# Patient Record
Sex: FEMALE | Race: WHITE | NOT HISPANIC OR LATINO | Employment: OTHER | URBAN - NONMETROPOLITAN AREA
[De-identification: names, ages, dates, MRNs, and addresses within clinical notes are randomized per-mention and may not be internally consistent; named-entity substitution may affect disease eponyms.]

---

## 2017-03-27 ENCOUNTER — TRANSCRIBE ORDERS (OUTPATIENT)
Dept: ADMINISTRATIVE | Facility: HOSPITAL | Age: 57
End: 2017-03-27

## 2017-03-27 DIAGNOSIS — E04.1 THYROID NODULE: Primary | ICD-10-CM

## 2017-04-03 ENCOUNTER — HOSPITAL ENCOUNTER (OUTPATIENT)
Dept: ULTRASOUND IMAGING | Facility: HOSPITAL | Age: 57
Discharge: HOME | End: 2017-04-03
Attending: INTERNAL MEDICINE | Admitting: INTERNAL MEDICINE

## 2017-04-03 DIAGNOSIS — E04.1 THYROID NODULE: ICD-10-CM

## 2017-04-03 PROCEDURE — 76536 US EXAM OF HEAD AND NECK: CPT

## 2017-04-03 PROCEDURE — 76536 US EXAM OF HEAD AND NECK: CPT | Performed by: RADIOLOGY

## 2017-04-24 ENCOUNTER — OFFICE VISIT (OUTPATIENT)
Dept: RETAIL CLINIC | Facility: CLINIC | Age: 57
End: 2017-04-24

## 2017-04-24 VITALS
BODY MASS INDEX: 36.26 KG/M2 | RESPIRATION RATE: 20 BRPM | WEIGHT: 225.6 LBS | HEART RATE: 76 BPM | OXYGEN SATURATION: 96 % | TEMPERATURE: 98.7 F | HEIGHT: 66 IN

## 2017-04-24 DIAGNOSIS — H66.002 ACUTE SUPPURATIVE OTITIS MEDIA OF LEFT EAR WITHOUT SPONTANEOUS RUPTURE OF TYMPANIC MEMBRANE, RECURRENCE NOT SPECIFIED: Primary | ICD-10-CM

## 2017-04-24 PROCEDURE — 99213 OFFICE O/P EST LOW 20 MIN: CPT | Performed by: NURSE PRACTITIONER

## 2017-04-24 RX ORDER — AMOXICILLIN AND CLAVULANATE POTASSIUM 875; 125 MG/1; MG/1
1 TABLET, FILM COATED ORAL 2 TIMES DAILY
Qty: 10 TABLET | Refills: 0 | Status: SHIPPED | OUTPATIENT
Start: 2017-04-24 | End: 2017-04-29

## 2017-04-24 RX ORDER — FLUTICASONE PROPIONATE 50 MCG
2 SPRAY, SUSPENSION (ML) NASAL DAILY
Qty: 1 BOTTLE | Refills: 0 | Status: SHIPPED | OUTPATIENT
Start: 2017-04-24 | End: 2017-05-24

## 2017-04-24 NOTE — PROGRESS NOTES
"Subjective   Wendi Washington is a 56 y.o. female.   Chief Complaint   Patient presents with   • Earache      Earache    There is pain in the left ear. This is a new problem. The current episode started in the past 7 days. The problem occurs constantly. The problem has been gradually worsening. There has been no fever. Pertinent negatives include no coughing, headaches, rhinorrhea or sore throat. She has tried NSAIDs for the symptoms. The treatment provided mild relief.      Wendi presents to the clinic today c/o left earache which started 3 days ago. Associated symptoms include sinus pressure. Has tried ibuprofen without adequate relief. Has a history of ear infections. Refer to ROS for additional information.    The following portions of the patient's history were reviewed and updated as appropriate: allergies, current medications, past family history, past medical history, past social history, past surgical history and problem list.    Review of Systems   Constitutional: Negative for chills and fever.   HENT: Positive for ear pain and sinus pressure. Negative for rhinorrhea and sore throat.    Respiratory: Negative for cough.    Neurological: Negative for headaches.       No Known Allergies    Pulse 76  Temp 98.7 °F (37.1 °C) (Temporal Artery )   Resp 20  Ht 66\" (167.6 cm)  Wt 225 lb 9.6 oz (102 kg)  SpO2 96%  BMI 36.41 kg/m2      Objective     Physical Exam   Constitutional: She is oriented to person, place, and time. She appears well-developed and well-nourished.   HENT:   Head: Normocephalic.   Right Ear: Tympanic membrane normal.   Left Ear: Tympanic membrane is erythematous. A middle ear effusion is present.   Nose: Nose normal. Right sinus exhibits no maxillary sinus tenderness and no frontal sinus tenderness. Left sinus exhibits no maxillary sinus tenderness and no frontal sinus tenderness.   Mouth/Throat: Oropharynx is clear and moist and mucous membranes are normal.   Cardiovascular: Normal rate, " regular rhythm and normal heart sounds.    Pulmonary/Chest: Effort normal and breath sounds normal.   Lymphadenopathy:     She has no cervical adenopathy.   Neurological: She is alert and oriented to person, place, and time.   Skin: Skin is warm and dry.   Nursing note and vitals reviewed.      Assessment/Plan   Wendi was seen today for earache.    Diagnoses and all orders for this visit:    Acute suppurative otitis media of left ear without spontaneous rupture of tympanic membrane, recurrence not specified  -     fluticasone (FLONASE) 50 MCG/ACT nasal spray; 2 sprays into each nostril Daily for 30 days. Administer 2 sprays in each nostril for each dose.  -     amoxicillin-clavulanate (AUGMENTIN) 875-125 MG per tablet; Take 1 tablet by mouth 2 (Two) Times a Day for 5 days.               Follow up with PCP or at the Urgent Care if symptoms worsen or fail to improve within next 48-72 hours.  Patient teaching information discussed and provided to the patient. Patient verbalized understanding.

## 2017-04-24 NOTE — PATIENT INSTRUCTIONS
"Otitis Media With Effusion  Otitis media with effusion is the presence of fluid in the middle ear. This is a common problem in children, which often follows ear infections. It may be present for weeks or longer after the infection. Unlike an acute ear infection, otitis media with effusion refers only to fluid behind the ear drum and not infection. Children with repeated ear and sinus infections and allergy problems are the most likely to get otitis media with effusion.  CAUSES   The most frequent cause of the fluid buildup is dysfunction of the eustachian tubes. These are the tubes that drain fluid in the ears to the back of the nose (nasopharynx).  SYMPTOMS   · The main symptom of this condition is hearing loss. As a result, you or your child may:    Listen to the TV at a loud volume.    Not respond to questions.    Ask \"what\" often when spoken to.    Mistake or confuse one sound or word for another.  · There may be a sensation of fullness or pressure but usually not pain.  DIAGNOSIS   · Your health care provider will diagnose this condition by examining you or your child's ears.  · Your health care provider may test the pressure in you or your child's ear with a tympanometer.  · A hearing test may be conducted if the problem persists.  TREATMENT   · Treatment depends on the duration and the effects of the effusion.  · Antibiotics, decongestants, nose drops, and cortisone-type drugs (tablets or nasal spray) may not be helpful.  · Children with persistent ear effusions may have delayed language or behavioral problems. Children at risk for developmental delays in hearing, learning, and speech may require referral to a specialist earlier than children not at risk.  · You or your child's health care provider may suggest a referral to an ear, nose, and throat surgeon for treatment. The following may help restore normal hearing:    Drainage of fluid.    Placement of ear tubes (tympanostomy tubes).    Removal of adenoids " (adenoidectomy).  HOME CARE INSTRUCTIONS   · Avoid secondhand smoke.  · Infants who are  are less likely to have this condition.  · Avoid feeding infants while they are lying flat.  · Avoid known environmental allergens.  · Avoid people who are sick.  SEEK MEDICAL CARE IF:   · Hearing is not better in 3 months.  · Hearing is worse.  · Ear pain.  · Drainage from the ear.  · Dizziness.  MAKE SURE YOU:   · Understand these instructions.  · Will watch your condition.  · Will get help right away if you are not doing well or get worse.     This information is not intended to replace advice given to you by your health care provider. Make sure you discuss any questions you have with your health care provider.     Document Released: 01/25/2006 Document Revised: 01/08/2016 Document Reviewed: 07/15/2014  [employer] Interactive Patient Education ©2016 [employer]

## 2017-08-22 ENCOUNTER — TRANSCRIBE ORDERS (OUTPATIENT)
Dept: ADMINISTRATIVE | Facility: HOSPITAL | Age: 57
End: 2017-08-22

## 2017-08-22 DIAGNOSIS — R60.0 EDEMA EXTREMITIES: Primary | ICD-10-CM

## 2017-09-17 ENCOUNTER — TRANSCRIBE ORDERS (OUTPATIENT)
Dept: ADMINISTRATIVE | Facility: HOSPITAL | Age: 57
End: 2017-09-17

## 2017-09-18 ENCOUNTER — APPOINTMENT (OUTPATIENT)
Dept: LAB | Facility: HOSPITAL | Age: 57
End: 2017-09-18
Attending: INTERNAL MEDICINE

## 2017-09-18 ENCOUNTER — TRANSCRIBE ORDERS (OUTPATIENT)
Dept: ADMINISTRATIVE | Facility: HOSPITAL | Age: 57
End: 2017-09-18

## 2017-09-18 DIAGNOSIS — E03.9 UNSPECIFIED HYPOTHYROIDISM: Primary | ICD-10-CM

## 2017-09-18 LAB
ALBUMIN SERPL-MCNC: 4.2 G/DL (ref 3.5–5)
ALBUMIN/GLOB SERPL: 1.3 G/DL (ref 1.5–2.5)
ALP SERPL-CCNC: 90 U/L (ref 35–104)
ALT SERPL W P-5'-P-CCNC: 29 U/L (ref 10–36)
ANION GAP SERPL CALCULATED.3IONS-SCNC: 6.9 MMOL/L (ref 3.6–11.2)
AST SERPL-CCNC: 24 U/L (ref 10–30)
BILIRUB SERPL-MCNC: 0.6 MG/DL (ref 0.2–1.8)
BUN BLD-MCNC: 11 MG/DL (ref 7–21)
BUN/CREAT SERPL: 13.8 (ref 7–25)
CALCIUM SPEC-SCNC: 9.8 MG/DL (ref 7.7–10)
CHLORIDE SERPL-SCNC: 109 MMOL/L (ref 99–112)
CO2 SERPL-SCNC: 23.1 MMOL/L (ref 24.3–31.9)
CREAT BLD-MCNC: 0.8 MG/DL (ref 0.43–1.29)
GFR SERPL CREATININE-BSD FRML MDRD: 74 ML/MIN/1.73
GLOBULIN UR ELPH-MCNC: 3.3 GM/DL
GLUCOSE BLD-MCNC: 121 MG/DL (ref 70–110)
OSMOLALITY SERPL CALC.SUM OF ELEC: 278.2 MOSM/KG (ref 273–305)
POTASSIUM BLD-SCNC: 4 MMOL/L (ref 3.5–5.3)
PROT SERPL-MCNC: 7.5 G/DL (ref 6–8)
SODIUM BLD-SCNC: 139 MMOL/L (ref 135–153)
T4 SERPL-MCNC: 12.6 MCG/DL (ref 4.5–10.9)
TSH SERPL DL<=0.05 MIU/L-ACNC: 0.03 MIU/ML (ref 0.55–4.78)

## 2017-09-18 PROCEDURE — 36415 COLL VENOUS BLD VENIPUNCTURE: CPT | Performed by: INTERNAL MEDICINE

## 2017-09-18 PROCEDURE — 80053 COMPREHEN METABOLIC PANEL: CPT | Performed by: INTERNAL MEDICINE

## 2017-09-18 PROCEDURE — 84480 ASSAY TRIIODOTHYRONINE (T3): CPT | Performed by: INTERNAL MEDICINE

## 2017-09-18 PROCEDURE — 84443 ASSAY THYROID STIM HORMONE: CPT | Performed by: INTERNAL MEDICINE

## 2017-09-18 PROCEDURE — 84436 ASSAY OF TOTAL THYROXINE: CPT | Performed by: INTERNAL MEDICINE

## 2017-09-19 LAB — T3 SERPL-MCNC: 126 NG/DL (ref 71–180)

## 2017-11-16 ENCOUNTER — HOSPITAL ENCOUNTER (OUTPATIENT)
Dept: CT IMAGING | Facility: HOSPITAL | Age: 57
Discharge: HOME | End: 2017-11-16
Attending: INTERNAL MEDICINE | Admitting: INTERNAL MEDICINE

## 2017-11-16 ENCOUNTER — TRANSCRIBE ORDERS (OUTPATIENT)
Dept: ADMINISTRATIVE | Facility: HOSPITAL | Age: 57
End: 2017-11-16

## 2017-11-16 DIAGNOSIS — R07.2 PRECORDIAL PAIN: ICD-10-CM

## 2017-11-16 DIAGNOSIS — R07.2 PRECORDIAL PAIN: Primary | ICD-10-CM

## 2017-11-16 LAB — CREAT BLDA-MCNC: 0.9 MG/DL (ref 0.6–1.3)

## 2017-11-16 PROCEDURE — 82565 ASSAY OF CREATININE: CPT

## 2017-11-16 PROCEDURE — 0 IOPAMIDOL 61 % SOLUTION: Performed by: INTERNAL MEDICINE

## 2017-11-16 PROCEDURE — 71275 CT ANGIOGRAPHY CHEST: CPT

## 2017-11-16 PROCEDURE — 71275 CT ANGIOGRAPHY CHEST: CPT | Performed by: RADIOLOGY

## 2017-11-16 RX ADMIN — IOPAMIDOL 70 ML: 612 INJECTION, SOLUTION INTRAVENOUS at 11:00

## 2017-12-14 ENCOUNTER — CONSULT (OUTPATIENT)
Dept: ONCOLOGY | Facility: CLINIC | Age: 57
End: 2017-12-14

## 2017-12-14 VITALS
TEMPERATURE: 98.7 F | DIASTOLIC BLOOD PRESSURE: 83 MMHG | HEART RATE: 74 BPM | BODY MASS INDEX: 38.32 KG/M2 | WEIGHT: 230 LBS | SYSTOLIC BLOOD PRESSURE: 131 MMHG | RESPIRATION RATE: 20 BRPM | OXYGEN SATURATION: 95 % | HEIGHT: 65 IN

## 2017-12-14 DIAGNOSIS — I26.99 OTHER PULMONARY EMBOLISM WITHOUT ACUTE COR PULMONALE, UNSPECIFIED CHRONICITY (HCC): ICD-10-CM

## 2017-12-14 DIAGNOSIS — I82.4Z2 ACUTE DEEP VEIN THROMBOSIS (DVT) OF DISTAL VEIN OF LEFT LOWER EXTREMITY (HCC): Primary | ICD-10-CM

## 2017-12-14 PROCEDURE — 85613 RUSSELL VIPER VENOM DILUTED: CPT | Performed by: NURSE PRACTITIONER

## 2017-12-14 PROCEDURE — 85300 ANTITHROMBIN III ACTIVITY: CPT | Performed by: NURSE PRACTITIONER

## 2017-12-14 PROCEDURE — 99205 OFFICE O/P NEW HI 60 MIN: CPT | Performed by: NURSE PRACTITIONER

## 2017-12-14 PROCEDURE — 85305 CLOT INHIBIT PROT S TOTAL: CPT | Performed by: NURSE PRACTITIONER

## 2017-12-14 PROCEDURE — 81240 F2 GENE: CPT | Performed by: NURSE PRACTITIONER

## 2017-12-14 PROCEDURE — 85306 CLOT INHIBIT PROT S FREE: CPT | Performed by: NURSE PRACTITIONER

## 2017-12-14 PROCEDURE — 85302 CLOT INHIBIT PROT C ANTIGEN: CPT | Performed by: NURSE PRACTITIONER

## 2017-12-14 PROCEDURE — 85732 THROMBOPLASTIN TIME PARTIAL: CPT | Performed by: NURSE PRACTITIONER

## 2017-12-14 PROCEDURE — 81241 F5 GENE: CPT | Performed by: NURSE PRACTITIONER

## 2017-12-14 PROCEDURE — 85303 CLOT INHIBIT PROT C ACTIVITY: CPT | Performed by: NURSE PRACTITIONER

## 2017-12-14 PROCEDURE — 85301 ANTITHROMBIN III ANTIGEN: CPT | Performed by: NURSE PRACTITIONER

## 2017-12-14 RX ORDER — LEVOTHYROXINE SODIUM 137 UG/1
137 TABLET ORAL DAILY
Refills: 5 | COMMUNITY
Start: 2017-11-17

## 2017-12-14 RX ORDER — RIVAROXABAN 20 MG/1
20 TABLET, FILM COATED ORAL
Refills: 2 | COMMUNITY
Start: 2017-11-17

## 2017-12-15 LAB
AT III AG PPP IA-ACNC: 91 % (ref 72–124)
AT III PPP CHRO-ACNC: 126 % (ref 75–135)

## 2017-12-15 NOTE — PROGRESS NOTES
"DATE OF CONSULTATION:  12/15/2017    REASON FOR REFERRAL: DVT/PE    REFERRING PHYSICIAN:  Dr. Corey Melton    CHIEF COMPLAINT:  LLE pain and swelling    HISTORY OF PRESENT ILLNESS:   Wendi Washington is a very pleasant 57 y.o. female who is being seen today in consultation at the request of Dr. Melton given recent DVT/PE and further evaluation for hypercoagulable state. Difficult to obtain detailed history as patient is a poor historian. However, she reports having chronic LLE pain and swelling which began worsening in August of this year. She had a CT of LLE which revealed arthritic change with osteophytosis and joint space narrowing medially with small effusion. She says she thought her worsening pain was related to these findings and she had been receiving steroid injections for her pain. She reports seeing Latoya Jacobs [facility] in October 2017 for varicose veins and underwent venous doppler which revealed acute nonocclusive DVT in the CFV , and subacute nonocclusive DVT in the popliteal and posterior tibial veins. RLE venous doppler was negative. CT chest from November 2017 revealed bilateral nonocclusive emboli. She was started on Xarelto per protocol and has been tolerating this well. Patient denies any new medications added recently or being on any hormonal therapy. She denies any long recent travel, recent surgeries or hospitalizations. She denies any family history of thrombosis. However, she reports possible history of PE in 2000 which was found while having \"exploratory lung surgery\" at [facility]. Records currently unavailable for review. She reports being overall active. She is a  and is on her feet most of the day. She reports chronic SOB on exertion but denies any worsening.  She denies any CP. She continues to complain of LLE pain and swelling. She denies any other specific complaints today.     PAST MEDICAL HISTORY:  Past Medical History:   Diagnosis Date   • Arthritis    • Asthma    • Disease of " "thyroid gland        PAST SURGICAL HISTORY:  Past Surgical History:   Procedure Laterality Date   • BACK SURGERY     •  SECTION     • CHOLECYSTECTOMY     • HIP SURGERY     • KNEE SURGERY      right in  and left in    • LUNG SURGERY     • LUNG SURGERY      right lung   • SHOULDER SURGERY         • THYROIDECTOMY         FAMILY HISTORY:  Family History   Problem Relation Age of Onset   • Hypertension Father    • Arthritis Father    • Asthma Father    • Cancer Father        SOCIAL HISTORY:  Social History     Social History   • Marital status:      Spouse name: N/A   • Number of children: N/A   • Years of education: N/A     Occupational History   • Not on file.     Social History Main Topics   • Smoking status: Never Smoker   • Smokeless tobacco: Never Used   • Alcohol use No   • Drug use: No   • Sexual activity: No     Other Topics Concern   • Not on file     Social History Narrative       MEDICATIONS:  The current medication list was reviewed in the EMR    Current Outpatient Prescriptions:   •  Cholecalciferol (VITAMIN D PO), Take  by mouth., Disp: , Rfl:   •  Cyanocobalamin (VITAMIN B-12 PO), Take  by mouth., Disp: , Rfl:   •  HYDROcodone-acetaminophen (NORCO) 5-325 MG per tablet, Take 1 tablet by mouth every 6 (six) hours as needed., Disp: , Rfl:   •  levothyroxine (SYNTHROID, LEVOTHROID) 137 MCG tablet, , Disp: , Rfl: 5  •  XARELTO 20 MG tablet, , Disp: , Rfl: 2    ALLERGIES:  No Known Allergies      REVIEW OF SYSTEMS:    A comprehensive 14 point review of systems was performed.  Significant findings as mentioned above.  All other systems reviewed and are negative.        Physical Exam   Vital Signs: /83  Pulse 74  Temp 98.7 °F (37.1 °C) (Oral)   Resp 20  Ht 165.1 cm (65\")  Wt 104 kg (230 lb)  SpO2 95%  BMI 38.27 kg/m2  General: Well developed, well nourished, alert and oriented x 3, in no acute distress.   Head: ATNC   Eyes: PERRL, No evidence of conjunctivitis.   Nose: No " nasal discharge.   Mouth: Oral mucosal membranes moist. No oral ulceration or hemorrhages.   Neck: Neck supple. No thyromegaly. No JVD.   Lungs: Clear in all fields to A&P without rales, rhonchi or wheezing.   Heart: S1, S2. Regular rate and rhythm. No murmurs, rubs, or gallops.   Abdomen: Soft. Bowel sounds are normoactive. Nontender with palpation. No Hepatosplenomegaly can be appreciated.   Extremities: Non pitting edema LLE. Peripheral pulses palpable and equal bilaterally.   Integumentary: No rash, sores, erythema or nodules. No blistering, bruising, or dry skin.   Neurologic: Alert, awake and oriented x 3. Grossly non-focal exam    IMAGING:    CT Chest 11/16/17  IMPRESSION:     1. Thin linear type filling defects of the bilateral lower lobe proximal  pulmonary segmental arteries with an appearance most consistent with  chronic nonocclusive emboli.  2. No large central emboli identified and no evidence of parenchymal  infarct or right heart strain.  3. Post surgical changes right lower lobe.  4. Linear atelectasis. Otherwise no acute thoracic findings identified.    Bilateral Venous Doppler 10/20/17  Right: No evidence of acute DVT.  Left: Acute nonocclusive DVT identified in the CFV, and subacute nonocclusive DVT in the popliteal and posterior tibial veins. Venous valvular insufficiency is noted at all levels of the LSV.    RECENT LABS:  Lab Results   Component Value Date    WBC 9.62 09/08/2016    HGB 13.6 09/08/2016    HCT 40.2 09/08/2016    MCV 91.2 09/08/2016    RDW 12.5 09/08/2016     09/08/2016    NEUTRORELPCT 71.9 (H) 09/08/2016    LYMPHORELPCT 18.6 (L) 09/08/2016    MONORELPCT 6.9 09/08/2016    EOSRELPCT 2.0 09/08/2016    BASORELPCT 0.3 09/08/2016    NEUTROABS 6.92 (H) 09/08/2016    LYMPHSABS 1.79 09/08/2016       Lab Results   Component Value Date     09/18/2017    K 4.0 09/18/2017    CO2 23.1 (L) 09/18/2017     09/18/2017    BUN 11 09/18/2017    CREATININE 0.90 11/16/2017     "EGFRIFNONA 74 09/18/2017    EGFRIFAFRI  09/08/2016      Comment:      <15 Indicative of kidney failure.    GLUCOSE 121 (H) 09/18/2017    CALCIUM 9.8 09/18/2017    ALKPHOS 90 09/18/2017    AST 24 09/18/2017    ALT 29 09/18/2017    BILITOT 0.6 09/18/2017    ALBUMIN 4.20 09/18/2017    PROTEINTOT 7.5 09/18/2017     ASSESSMENT & PLAN:  Wendi Washington is a very pleasant 57 y.o. female with    1.  LLE DVT and bilateral pulmonary emboli:  -Venous doppler from October 2017 revealed acute nonocclusive DVT in the CFV and subacute nonocclusive DVT in the popliteal and posterior tibial veins. RLE venous doppler was negative. CT chest from November 2017 revealed bilateral nonocclusive emboli. She was started on Xarelto per protocol and has been tolerating this well.   She also reports possible PE in 2000 during \"exploratory lung surgery\" at [facility] but denies receiving any anticoagulation at that time. Records are currently unavailable for review. Will request records prior to next follow up.  --Based on history above, DVT/PE likely unprovoked. Therefore, will obtain hypercoagulable workup today for further evaluation including Factor V Leiden, Protein C and S, Lupus anticoagulant, Beta 2 Glycoprotein assay, Anticardiolipin antibodies, Prothrombin gene mutation and Antithrombin panel.   - Will try to obtain records as mentioned above, if patient has history of recurrent DVT, will recommend lifelong anticoagulation. Again discussed the risks/benefits of Xarelto with patient.   -We will have her follow up in 3-4 weeks with hypercoagulable workup results.     The patient was in agreement with the plan and all questions were answered to her satisfaction.     Thank you so much for allowing us to participate in the care of Wendi Washington . Please do not hesitate to contact us with any questions or concerns.     I spent 60 minutes with Wendi Washington today. More than 50% of the time was spent in counseling / coordination of care for the above " problems.      Electronically Signed by: LEBRON Sanchez , December 15, 2017 12:18 PM     CC:   No ref. provider found  Corey Melton MD

## 2017-12-16 LAB
B2 GLYCOPROT1 IGA SER-ACNC: <9 GPI IGA UNITS (ref 0–25)
B2 GLYCOPROT1 IGG SER-ACNC: <9 GPI IGG UNITS (ref 0–20)
B2 GLYCOPROT1 IGM SER-ACNC: <9 GPI IGM UNITS (ref 0–32)
DRVVT IMM 1:2 NP PPP: 73.8 SEC (ref 0–47)
LA NT PLATELET PPP: 40.2 SEC (ref 0–51.9)
LUPUS ANTICOAGULANT REFLEX: ABNORMAL
PROTEIN C-FUNCTIONAL: 141 % (ref 73–180)
PROTEIN S-FUNCTIONAL: 163 % (ref 63–140)
SCREEN DRVVT: 1.6 RATIO (ref 0.8–1.2)
SCREEN DRVVT: 103.5 SEC (ref 0–47)

## 2017-12-17 LAB
PROT C AG PPP IA-ACNC: 109 % (ref 60–150)
PROT S FREE PPP-ACNC: 120 % (ref 57–157)
PROT S PPP-ACNC: 95 % (ref 60–150)

## 2017-12-19 LAB
CARDIOLIPIN IGA SER IA-ACNC: <9 APL U/ML (ref 0–11)
CARDIOLIPIN IGG SER IA-ACNC: <9 GPL U/ML (ref 0–14)
CARDIOLIPIN IGM SER IA-ACNC: 10 MPL U/ML (ref 0–12)
F2 GENE MUT ANL BLD/T: NORMAL
F5 GENE MUT ANL BLD/T: NORMAL
FACTOR V COMMENT: NORMAL
Lab: NORMAL
PS IGA SER-ACNC: 1 APS IGA (ref 0–20)
PS IGG SER-ACNC: 1 GPS IGG (ref 0–11)
PS IGM SER-ACNC: 7 MPS IGM (ref 0–25)

## 2017-12-23 ENCOUNTER — HOSPITAL ENCOUNTER (EMERGENCY)
Facility: HOSPITAL | Age: 57
Discharge: HOME | End: 2017-12-23
Attending: FAMILY MEDICINE | Admitting: FAMILY MEDICINE

## 2017-12-23 ENCOUNTER — APPOINTMENT (OUTPATIENT)
Dept: CT IMAGING | Facility: HOSPITAL | Age: 57
End: 2017-12-23

## 2017-12-23 VITALS
HEART RATE: 74 BPM | BODY MASS INDEX: 36.16 KG/M2 | HEIGHT: 66 IN | TEMPERATURE: 98 F | RESPIRATION RATE: 18 BRPM | SYSTOLIC BLOOD PRESSURE: 140 MMHG | WEIGHT: 225 LBS | OXYGEN SATURATION: 96 % | DIASTOLIC BLOOD PRESSURE: 85 MMHG

## 2017-12-23 DIAGNOSIS — S39.92XA INJURY OF LOW BACK, INITIAL ENCOUNTER: Primary | ICD-10-CM
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
## 2017-12-23 PROCEDURE — 72131 CT LUMBAR SPINE W/O DYE: CPT | Performed by: RADIOLOGY

## 2017-12-23 PROCEDURE — 99283 EMERGENCY DEPT VISIT LOW MDM: CPT

## 2017-12-23 PROCEDURE — 72131 CT LUMBAR SPINE W/O DYE: CPT

## 2017-12-23 NOTE — ED PROVIDER NOTES
Subjective   Patient is a 57 y.o. female presenting with fall.   History provided by:  Patient   used: No    Fall   Mechanism of injury: fall    Injury location:  Torso  Torso injury location:  Back  Incident location:  Home  Time since incident:  1 hour  Arrived directly from scene: yes    Fall:     Fall occurred:  Down stairs (walking down her outside stairs and slipped on some mold on one of the steps and landed on her buttocks on the next step down)    Impact surface:  Stairs    Point of impact:  Buttocks  Tetanus status:  Up to date  Prior to arrival data:     Patient ambulatory at scene: yes      Loss of consciousness: no      Amnesic to event: no    Associated symptoms: back pain    Associated symptoms: no abdominal pain, no chest pain, no headaches, no loss of consciousness, no nausea, no neck pain and no vomiting    Risk factors: anticoagulation therapy        Review of Systems   Constitutional: Negative for activity change and fever.   HENT: Negative for congestion, ear pain and sore throat.    Eyes: Negative for pain.   Respiratory: Negative for cough, shortness of breath and wheezing.    Cardiovascular: Negative for chest pain.   Gastrointestinal: Negative for abdominal distention, abdominal pain, diarrhea, nausea and vomiting.   Genitourinary: Negative for difficulty urinating and dysuria.   Musculoskeletal: Positive for back pain. Negative for arthralgias, myalgias and neck pain.   Skin: Negative for rash and wound.   Neurological: Negative for dizziness, loss of consciousness and headaches.   Psychiatric/Behavioral: Negative for agitation.   All other systems reviewed and are negative.      Past Medical History:   Diagnosis Date   • Arthritis    • Asthma    • Disease of thyroid gland        No Known Allergies    Past Surgical History:   Procedure Laterality Date   • BACK SURGERY     •  SECTION     • CHOLECYSTECTOMY     • HIP SURGERY     • KNEE SURGERY      right in   and left in 1998   • LUNG SURGERY     • LUNG SURGERY      right lung   • SHOULDER SURGERY      2004   • THYROIDECTOMY         Family History   Problem Relation Age of Onset   • Hypertension Father    • Arthritis Father    • Asthma Father    • Cancer Father        Social History     Social History   • Marital status:      Spouse name: N/A   • Number of children: N/A   • Years of education: N/A     Social History Main Topics   • Smoking status: Never Smoker   • Smokeless tobacco: Never Used   • Alcohol use No   • Drug use: No   • Sexual activity: No     Other Topics Concern   • None     Social History Narrative           Objective   Physical Exam   Constitutional: She is oriented to person, place, and time. She appears well-developed and well-nourished.   HENT:   Head: Normocephalic and atraumatic.   Eyes: EOM are normal. Pupils are equal, round, and reactive to light.   Neck: Normal range of motion. Neck supple.   Cardiovascular: Normal rate, regular rhythm and normal heart sounds.    Pulmonary/Chest: Effort normal and breath sounds normal.   Abdominal: Soft. Bowel sounds are normal.   Musculoskeletal:        Lumbar back: She exhibits decreased range of motion, tenderness, bony tenderness and pain.   Neurological: She is alert and oriented to person, place, and time.   Skin: Skin is warm and dry.   Psychiatric: She has a normal mood and affect. Her behavior is normal. Judgment and thought content normal.   Nursing note and vitals reviewed.      Procedures         ED Course  ED Course   Comment By Time   Pt up ambulating through ED without difficulty at this time. Has refused anything for pain multiple times. Have discussed findings with patient as well as at home treatment. She is advised to f/u with PCP as needed. Return to ED with any worsened symptoms. AZALEA Rees 12/23 1056                  [facility]  Number of Diagnoses or Management Options     Amount and/or Complexity of Data Reviewed  Clinical lab  tests: ordered and reviewed  Tests in the radiology section of CPT®: ordered and reviewed  Tests in the medicine section of CPT®: reviewed and ordered    Patient Progress  Patient progress: stable      Final diagnoses:   Injury of low back, initial encounter   Fall, initial encounter            AZALEA Rees  12/23/17 1059

## 2018-01-11 ENCOUNTER — OFFICE VISIT (OUTPATIENT)
Dept: ONCOLOGY | Facility: CLINIC | Age: 58
End: 2018-01-11

## 2018-01-11 VITALS
HEART RATE: 94 BPM | OXYGEN SATURATION: 97 % | RESPIRATION RATE: 18 BRPM | SYSTOLIC BLOOD PRESSURE: 142 MMHG | WEIGHT: 223.4 LBS | DIASTOLIC BLOOD PRESSURE: 86 MMHG | TEMPERATURE: 97.9 F | BODY MASS INDEX: 36.06 KG/M2

## 2018-01-11 DIAGNOSIS — I82.4Y2 ACUTE DEEP VEIN THROMBOSIS (DVT) OF PROXIMAL VEIN OF LEFT LOWER EXTREMITY (HCC): Primary | ICD-10-CM

## 2018-01-11 DIAGNOSIS — I26.99 OTHER ACUTE PULMONARY EMBOLISM WITHOUT ACUTE COR PULMONALE (HCC): ICD-10-CM

## 2018-01-11 PROCEDURE — 99214 OFFICE O/P EST MOD 30 MIN: CPT | Performed by: NURSE PRACTITIONER

## 2018-01-11 NOTE — PROGRESS NOTES
"DATE:  1/11/2018    REASON FOR REFERRAL: DVT/PE    REFERRING PHYSICIAN:  Dr. Corey Melton    CHIEF COMPLAINT:  Follow up of DVT/PE    HISTORY OF PRESENT ILLNESS:   Wendi Washington is a very pleasant 57 y.o. female who is being seen today in consultation at the request of Dr. Melton given recent DVT/PE and further evaluation for hypercoagulable state. Difficult to obtain detailed history as patient is a poor historian. However, she reports having chronic LLE pain and swelling which began worsening in August of this year. She had a CT of LLE which revealed arthritic change with osteophytosis and joint space narrowing medially with small effusion. She says she thought her worsening pain was related to these findings and she had been receiving steroid injections for her pain. She reports seeing Latoya Jacobs At [facility] in October 2017 for varicose veins and underwent venous doppler which revealed acute nonocclusive DVT in the CFV , and subacute nonocclusive DVT in the popliteal and posterior tibial veins. RLE venous doppler was negative. CT chest from November 2017 revealed bilateral nonocclusive emboli. She was started on Xarelto per protocol and has been tolerating this well. Patient denies any new medications added recently or being on any hormonal therapy. She denies any long recent travel, recent surgeries or hospitalizations. She denies any family history of thrombosis. However, she reports possible history of PE in 2000 which was found while having \"exploratory lung surgery\" at [facility]. Records currently unavailable for review. She reports being overall active. She is a  and is on her feet most of the day. She reports chronic SOB on exertion but denies any worsening.  She denies any CP. She continues to complain of LLE pain and swelling. She denies any other specific complaints today.     INTERVAL HISTORY:   Ms. Washington presents today for follow up of DVT/PE. Since her last visit, she reports she has been doing " well. She continues to be on Xarelto and is tolerating this well without any significant SE. She says she continues to have swelling in LLE but this has been slowly improving. She continues to have SOB on exertion but denies any worsening. She denies any new complaints today. She is anxious to hear results from recent hypercoagulable workup. She denies any obvious blood loss or active bleeding.     PAST MEDICAL HISTORY:  Past Medical History:   Diagnosis Date   • Arthritis    • Asthma    • Disease of thyroid gland        PAST SURGICAL HISTORY:  Past Surgical History:   Procedure Laterality Date   • BACK SURGERY     •  SECTION     • CHOLECYSTECTOMY     • HIP SURGERY     • KNEE SURGERY      right in  and left in    • LUNG SURGERY     • LUNG SURGERY      right lung   • SHOULDER SURGERY         • THYROIDECTOMY         FAMILY HISTORY:  Family History   Problem Relation Age of Onset   • Hypertension Father    • Arthritis Father    • Asthma Father    • Cancer Father        SOCIAL HISTORY:  Social History     Social History   • Marital status:      Spouse name: N/A   • Number of children: N/A   • Years of education: N/A     Occupational History   • Not on file.     Social History Main Topics   • Smoking status: Never Smoker   • Smokeless tobacco: Never Used   • Alcohol use No   • Drug use: No   • Sexual activity: No     Other Topics Concern   • Not on file     Social History Narrative       MEDICATIONS:  The current medication list was reviewed in the EMR    Current Outpatient Prescriptions:   •  Cholecalciferol (VITAMIN D PO), Take  by mouth., Disp: , Rfl:   •  Cyanocobalamin (VITAMIN B-12 PO), Take  by mouth., Disp: , Rfl:   •  HYDROcodone-acetaminophen (NORCO) 5-325 MG per tablet, Take 1 tablet by mouth every 6 (six) hours as needed., Disp: , Rfl:   •  levothyroxine (SYNTHROID, LEVOTHROID) 137 MCG tablet, , Disp: , Rfl: 5  •  XARELTO 20 MG tablet, , Disp: , Rfl: 2    ALLERGIES:  No Known  Allergies      REVIEW OF SYSTEMS:    A comprehensive 14 point review of systems was performed.  Significant findings as mentioned above.  All other systems reviewed and are negative.      Physical Exam   Vital Signs: /86  Pulse 94  Temp 97.9 °F (36.6 °C) (Oral)   Resp 18  Wt 101 kg (223 lb 6.4 oz)  SpO2 97%  BMI 36.06 kg/m2  General: Well developed, well nourished, alert and oriented x 3, in no acute distress.   Head: ATNC   Eyes: PERRL, No evidence of conjunctivitis.   Nose: No nasal discharge.   Mouth: Oral mucosal membranes moist. No oral ulceration or hemorrhages.   Neck: Neck supple. No thyromegaly. No JVD.   Lungs: Clear in all fields to A&P without rales, rhonchi or wheezing.   Heart: S1, S2. Regular rate and rhythm. No murmurs, rubs, or gallops.   Abdomen: Soft. Bowel sounds are normoactive. Nontender with palpation. No Hepatosplenomegaly can be appreciated.   Extremities: Non pitting edema LLE improved from last OV. Peripheral pulses palpable and equal bilaterally.   Integumentary: No rash, sores, erythema or nodules. No blistering, bruising, or dry skin.   Neurologic: Alert, awake and oriented x 3. Grossly non-focal exam    IMAGING:    CT Chest 11/16/17  IMPRESSION:     1. Thin linear type filling defects of the bilateral lower lobe proximal  pulmonary segmental arteries with an appearance most consistent with  chronic nonocclusive emboli.  2. No large central emboli identified and no evidence of parenchymal  infarct or right heart strain.  3. Post surgical changes right lower lobe.  4. Linear atelectasis. Otherwise no acute thoracic findings identified.    Bilateral Venous Doppler 10/20/17  Right: No evidence of acute DVT.  Left: Acute nonocclusive DVT identified in the CFV, and subacute nonocclusive DVT in the popliteal and posterior tibial veins. Venous valvular insufficiency is noted at all levels of the LSV.    RECENT LABS:  Lab Results   Component Value Date    WBC 9.62 09/08/2016    HGB  13.6 09/08/2016    HCT 40.2 09/08/2016    MCV 91.2 09/08/2016    RDW 12.5 09/08/2016     09/08/2016    NEUTRORELPCT 71.9 (H) 09/08/2016    LYMPHORELPCT 18.6 (L) 09/08/2016    MONORELPCT 6.9 09/08/2016    EOSRELPCT 2.0 09/08/2016    BASORELPCT 0.3 09/08/2016    NEUTROABS 6.92 (H) 09/08/2016    LYMPHSABS 1.79 09/08/2016       Lab Results   Component Value Date     09/18/2017    K 4.0 09/18/2017    CO2 23.1 (L) 09/18/2017     09/18/2017    BUN 11 09/18/2017    CREATININE 0.90 11/16/2017    EGFRIFNONA 74 09/18/2017    EGFRIFAFRI  09/08/2016      Comment:      <15 Indicative of kidney failure.    GLUCOSE 121 (H) 09/18/2017    CALCIUM 9.8 09/18/2017    ALKPHOS 90 09/18/2017    AST 24 09/18/2017    ALT 29 09/18/2017    BILITOT 0.6 09/18/2017    ALBUMIN 4.20 09/18/2017    PROTEINTOT 7.5 09/18/2017     Hypercoagulable workup 12/14/17:  AntiThromb III Func 75 - 135 % 126     Antithrombin Antigen 72 - 124 % 91        Ref Range & Units 4wk ago     Anticardiolipin IgG 0 - 14 GPL U/mL <9   Comments:                           Negative:              <15                             Indeterminate:     15 - 20                             Low-Med Positive: >20 - 80                             High Positive:         >80   Anticardiolipin IgM 0 - 12 MPL U/mL 10   Comments:                           Negative:              <13                             Indeterminate:     13 - 20                             Low-Med Positive: >20 - 80                             High Positive:         >80   Anticardiolipin IgA 0 - 11 APL U/mL <9   Comments:                           Negative:              <12                             Indeterminate:     12 - 20                             Low-Med Positive: >20 - 80                             High Positive:         >80   Antiphosphatidylserine IgM 0 - 25 MPS IgM 7   Antiphosphatidylserine IgA 0 - 20 APS IgA 1   Antiphosphatidylserine IgG 0 - 11 GPS IgG 1     Beta-2 Glyco 1 IgG 0 - 20  GPI IgG units <9   Comments: The reference interval reflects a 3SD or 99th percentile interval,   which is thought to represent a potentially clinically significant   result in accordance with the International Consensus Statement on   the classification criteria for definitive antiphospholipid syndrome   (APS). J Thromb Haem 2006;4:295-306.   Beta-2 Glyco 1 IgA 0 - 25 GPI IgA units <9   Comments: The reference interval reflects a 3SD or 99th percentile interval,   which is thought to represent a potentially clinically significant   result in accordance with the International Consensus Statement on   the classification criteria for definitive antiphospholipid syndrome   (APS). J Thromb Haem 2006;4:295-306.   Beta-2 Glyco 1 IgM 0 - 32 GPI IgM units <9   Comments: The reference interval reflects a 3SD or 99th percentile interval,   which is thought to represent a potentially clinically significant   result in accordance with the International Consensus Statement on   the classification criteria for definitive antiphospholipid syndrome   (APS). J Thromb Haem 2006;4:295-306     PTT Lupus Anticoagulant 0.0 - 51.9 sec 40.2   Dilute Viper Venom Time 0.0 - 47.0 sec 103.5 (H)   Lupus Anticoagulant Reflex  Comment:   Comments: Results are consistent with the presence of a lupus anticoagulant.   NOTE: Only persistent lupus anticoagulants are thought to be of clinical   significance. For this reason, repeat testing in 12 or more weeks after an   initial positive result should be considered to confirm or refute the   presence of a lupus anticoagulant, depending on clinical presentation.   Results of lupus anticoagulant tests may be falsely positive in the   presence of certain anticoagulant therapies.   Resulting Agency  [facility]   Narrative   Performed at:  [address] - [address]  [id number]  : Geovanny Werner MD, Phone:  [phone]      Specimen Collected: 12/14/17  2:38 PM Last  "Resulted: 12/16/17  4:09 AM                            Ref Range & Units 4wk ago     Dilute Viper Venom 1:1 Mix 0.0 - 47.0 sec 73.8 (H)   Resulting Agency  [facility]   Narrative   Performed at:  01 - [facility] [address]  [id number]  : Geovanny Werner MD, Phone:  [phone]      Specimen Collected: 12/14/17  2:38 PM Last Resulted: 12/16/17  4:09 AM                            Ref Range & Units 4wk ago     Dilute Viper Venom Time 0.8 - 1.2 ratio 1.6 (H)              Ref Range & Units 4wk ago     Protein C-Functional 73 - 180 % 141   Protein S-Functional 63 - 140 % 163 (H)        Ref Range & Units 4wk ago     Protein C Antigen 60 - 150 % 109   Protein S Ag, Total 60 - 150 % 95   Comments: This test was developed and its performance characteristics   determined by [facility]. It has not been cleared or approved   by the Food and Drug Administration.   Protein S Ag, Free 57 - 157 % 120     Factor II, DNA Analysis Comment   Comments: NEGATIVE   No mutation identified     Factor V Leiden Comment   Comments: Result:  Negative (no mutation found)      ASSESSMENT & PLAN:  Wendi Washington is a very pleasant 57 y.o. female with    1.  LLE DVT and bilateral pulmonary emboli:  -Venous doppler from October 2017 revealed acute nonocclusive DVT in the CFV and subacute nonocclusive DVT in the popliteal and posterior tibial veins. RLE venous doppler was negative. CT chest from November 2017 revealed bilateral nonocclusive emboli. She was started on Xarelto per protocol in October 2017 and has been tolerating this well.   She also reports possible PE in 2000 during \"exploratory lung surgery\" at [facility] but denies receiving any anticoagulation at that time. Tried to request records but unable to obtain. Patient says that her PCP may have these records and will bring with her at next follow up.   -Based on history above, current DVT/PE likely unprovoked. Therefore, we obtained hypercoagulable workup for " further evaluation including Factor V Leiden, Protein C and S, Lupus anticoagulant, Beta 2 Glycoprotein assay, Anticardiolipin antibodies, Prothrombin gene mutation and Antithrombin panel. Overall, workup was unremarkable other than the exception of increased Protein S which carries no clinical significance. Also, noted increased lupus anticoagulant. D/w patient that only persistent lupus anticoagulants are considered to be of clinical significance. Results can also be falsely positive in the presence of anticoagulant therapies. Therefore, will follow up in 3 months with repeat lupus anticoagulant.   - Given that we were unable to obtain records, it is unclear whether patient truly had previous history of PE but again, most recent DVT/PE is likely unprovoked, etiology unknown. Therefore, recommend patient continue anticoagulation lifelong. Again discussed the risks/benefits of anticoagulation (Xarelto) and lifelong therapy with the patient. She says she is anxious about continuing this lifelong but also understands the risks and complications of developing recurrent clots. Regardless of what she decides, she will need at least 6 months of anticoagulation. We will discuss our recommendations of lifelong anticoagulation again with next follow up in 3 months.     The patient was in agreement with the plan and all questions were answered to her satisfaction.     Thank you so much for allowing us to participate in the care of Wendi HUFFMAN Pablolowell . Please do not hesitate to contact us with any questions or concerns.     I spent 25 minutes with Wendi Washington today. More than 50% of the time was spent in counseling / coordination of care for the above problems.      Electronically Signed by: LEBRON Sanchez , January 11, 2018 3:39 PM     CC:   No ref. provider found  Corey Melton MD

## 2018-01-28 ENCOUNTER — LAB (OUTPATIENT)
Dept: LAB | Facility: HOSPITAL | Age: 58
End: 2018-01-28
Attending: INTERNAL MEDICINE

## 2018-01-28 ENCOUNTER — TRANSCRIBE ORDERS (OUTPATIENT)
Dept: ADMINISTRATIVE | Facility: HOSPITAL | Age: 58
End: 2018-01-28

## 2018-01-28 DIAGNOSIS — I10 ESSENTIAL HYPERTENSION, MALIGNANT: ICD-10-CM

## 2018-01-28 DIAGNOSIS — E55.9 AVITAMINOSIS D: ICD-10-CM

## 2018-01-28 DIAGNOSIS — E55.9 AVITAMINOSIS D: Primary | ICD-10-CM

## 2018-01-28 LAB
25(OH)D3 SERPL-MCNC: 18 NG/ML
ALBUMIN SERPL-MCNC: 4 G/DL (ref 3.5–5)
ALBUMIN/GLOB SERPL: 1.4 G/DL (ref 1.5–2.5)
ALP SERPL-CCNC: 85 U/L (ref 35–104)
ALT SERPL W P-5'-P-CCNC: 32 U/L (ref 10–36)
ANION GAP SERPL CALCULATED.3IONS-SCNC: 5.5 MMOL/L (ref 3.6–11.2)
AST SERPL-CCNC: 26 U/L (ref 10–30)
BASOPHILS # BLD AUTO: 0.03 10*3/MM3 (ref 0–0.3)
BASOPHILS NFR BLD AUTO: 0.5 % (ref 0–2)
BILIRUB SERPL-MCNC: 0.6 MG/DL (ref 0.2–1.8)
BUN BLD-MCNC: 8 MG/DL (ref 7–21)
BUN/CREAT SERPL: 11.1 (ref 7–25)
CALCIUM SPEC-SCNC: 8.9 MG/DL (ref 7.7–10)
CHLORIDE SERPL-SCNC: 108 MMOL/L (ref 99–112)
CHOLEST SERPL-MCNC: 183 MG/DL (ref 0–200)
CO2 SERPL-SCNC: 29.5 MMOL/L (ref 24.3–31.9)
CREAT BLD-MCNC: 0.72 MG/DL (ref 0.43–1.29)
DEPRECATED RDW RBC AUTO: 41.3 FL (ref 37–54)
EOSINOPHIL # BLD AUTO: 0.35 10*3/MM3 (ref 0–0.7)
EOSINOPHIL NFR BLD AUTO: 6.2 % (ref 0–5)
ERYTHROCYTE [DISTWIDTH] IN BLOOD BY AUTOMATED COUNT: 12.8 % (ref 11.5–14.5)
FOLATE SERPL-MCNC: 6.28 NG/ML (ref 5.4–20)
GFR SERPL CREATININE-BSD FRML MDRD: 83 ML/MIN/1.73
GLOBULIN UR ELPH-MCNC: 2.9 GM/DL
GLUCOSE BLD-MCNC: 101 MG/DL (ref 70–110)
HCT VFR BLD AUTO: 42.3 % (ref 37–47)
HDLC SERPL-MCNC: 40 MG/DL (ref 60–100)
HGB BLD-MCNC: 14 G/DL (ref 12–16)
IMM GRANULOCYTES # BLD: 0.01 10*3/MM3 (ref 0–0.03)
IMM GRANULOCYTES NFR BLD: 0.2 % (ref 0–0.5)
LDLC SERPL CALC-MCNC: 107 MG/DL (ref 0–100)
LDLC/HDLC SERPL: 2.67 {RATIO}
LYMPHOCYTES # BLD AUTO: 1.57 10*3/MM3 (ref 1–3)
LYMPHOCYTES NFR BLD AUTO: 27.6 % (ref 21–51)
MCH RBC QN AUTO: 30.3 PG (ref 27–33)
MCHC RBC AUTO-ENTMCNC: 33.1 G/DL (ref 33–37)
MCV RBC AUTO: 91.6 FL (ref 80–94)
MONOCYTES # BLD AUTO: 0.42 10*3/MM3 (ref 0.1–0.9)
MONOCYTES NFR BLD AUTO: 7.4 % (ref 0–10)
NEUTROPHILS # BLD AUTO: 3.31 10*3/MM3 (ref 1.4–6.5)
NEUTROPHILS NFR BLD AUTO: 58.1 % (ref 30–70)
OSMOLALITY SERPL CALC.SUM OF ELEC: 283.4 MOSM/KG (ref 273–305)
PLATELET # BLD AUTO: 324 10*3/MM3 (ref 130–400)
PMV BLD AUTO: 9.4 FL (ref 6–10)
POTASSIUM BLD-SCNC: 4.2 MMOL/L (ref 3.5–5.3)
PROT SERPL-MCNC: 6.9 G/DL (ref 6–8)
RBC # BLD AUTO: 4.62 10*6/MM3 (ref 4.2–5.4)
SODIUM BLD-SCNC: 143 MMOL/L (ref 135–153)
TRIGL SERPL-MCNC: 182 MG/DL (ref 0–150)
TSH SERPL DL<=0.05 MIU/L-ACNC: 0.07 MIU/ML (ref 0.55–4.78)
VIT B12 BLD-MCNC: 969 PG/ML (ref 211–911)
VLDLC SERPL-MCNC: 36.4 MG/DL
WBC NRBC COR # BLD: 5.69 10*3/MM3 (ref 4.5–12.5)

## 2018-01-28 PROCEDURE — 80061 LIPID PANEL: CPT

## 2018-01-28 PROCEDURE — 82746 ASSAY OF FOLIC ACID SERUM: CPT

## 2018-01-28 PROCEDURE — 80053 COMPREHEN METABOLIC PANEL: CPT

## 2018-01-28 PROCEDURE — 82306 VITAMIN D 25 HYDROXY: CPT

## 2018-01-28 PROCEDURE — 85025 COMPLETE CBC W/AUTO DIFF WBC: CPT

## 2018-01-28 PROCEDURE — 84443 ASSAY THYROID STIM HORMONE: CPT

## 2018-01-28 PROCEDURE — 82607 VITAMIN B-12: CPT

## 2018-01-28 PROCEDURE — 36415 COLL VENOUS BLD VENIPUNCTURE: CPT

## 2018-04-11 ENCOUNTER — LAB (OUTPATIENT)
Dept: ONCOLOGY | Facility: CLINIC | Age: 58
End: 2018-04-11

## 2018-04-11 VITALS
SYSTOLIC BLOOD PRESSURE: 127 MMHG | HEART RATE: 76 BPM | DIASTOLIC BLOOD PRESSURE: 87 MMHG | RESPIRATION RATE: 20 BRPM | OXYGEN SATURATION: 100 %

## 2018-04-11 DIAGNOSIS — I82.4Y2 ACUTE DEEP VEIN THROMBOSIS (DVT) OF PROXIMAL VEIN OF LEFT LOWER EXTREMITY (HCC): ICD-10-CM

## 2018-04-11 PROCEDURE — 85613 RUSSELL VIPER VENOM DILUTED: CPT | Performed by: NURSE PRACTITIONER

## 2018-04-11 PROCEDURE — 85732 THROMBOPLASTIN TIME PARTIAL: CPT | Performed by: NURSE PRACTITIONER

## 2018-04-12 ENCOUNTER — OFFICE VISIT (OUTPATIENT)
Dept: ONCOLOGY | Facility: CLINIC | Age: 58
End: 2018-04-12

## 2018-04-12 VITALS
OXYGEN SATURATION: 95 % | SYSTOLIC BLOOD PRESSURE: 150 MMHG | DIASTOLIC BLOOD PRESSURE: 81 MMHG | WEIGHT: 220.4 LBS | BODY MASS INDEX: 35.57 KG/M2 | TEMPERATURE: 98.5 F | RESPIRATION RATE: 18 BRPM | HEART RATE: 70 BPM

## 2018-04-12 DIAGNOSIS — I82.4Y2 ACUTE DEEP VEIN THROMBOSIS (DVT) OF PROXIMAL VEIN OF LEFT LOWER EXTREMITY (HCC): ICD-10-CM

## 2018-04-12 DIAGNOSIS — I26.99 OTHER PULMONARY EMBOLISM WITHOUT ACUTE COR PULMONALE, UNSPECIFIED CHRONICITY (HCC): ICD-10-CM

## 2018-04-12 PROCEDURE — 99213 OFFICE O/P EST LOW 20 MIN: CPT | Performed by: NURSE PRACTITIONER

## 2018-04-12 NOTE — PROGRESS NOTES
"DATE:  4/12/2018    REASON FOR FOLLOW UP: DVT/PE    REFERRING PHYSICIAN:  Dr. Corey Melton    CHIEF COMPLAINT:  Follow up of DVT/PE    HISTORY OF PRESENT ILLNESS:   Wendi Washington is a very pleasant 57 y.o. female who is being seen today in consultation at the request of Dr. Melton given recent DVT/PE and further evaluation for hypercoagulable state. Difficult to obtain detailed history as patient is a poor historian. However, she reports having chronic LLE pain and swelling which began worsening in August of this year. She had a CT of LLE which revealed arthritic change with osteophytosis and joint space narrowing medially with small effusion. She says she thought her worsening pain was related to these findings and she had been receiving steroid injections for her pain. She reports seeing Latoya Jacobs At [facility] in October 2017 for varicose veins and underwent venous doppler which revealed acute nonocclusive DVT in the CFV , and subacute nonocclusive DVT in the popliteal and posterior tibial veins. RLE venous doppler was negative. CT chest from November 2017 revealed bilateral nonocclusive emboli. She was started on Xarelto per protocol and has been tolerating this well. Patient denies any new medications added recently or being on any hormonal therapy. She denies any long recent travel, recent surgeries or hospitalizations. She denies any family history of thrombosis. However, she reports possible history of PE in 2000 which was found while having \"exploratory lung surgery\" at [facility]. Records currently unavailable for review. She reports being overall active. She is a  and is on her feet most of the day. She reports chronic SOB on exertion but denies any worsening.  She denies any CP. She continues to complain of LLE pain and swelling. She denies any other specific complaints today.     INTERVAL HISTORY:   Ms. Washington presents today for follow up of DVT/PE. Since her last visit, she reports she has been doing " well. She continues to be on Xarelto and is tolerating this well without any significant SE. She reports swelling in LLE has improved. Currently, she denies any SOB or CP. She denies any new complaints today. She denies any obvious blood loss or active bleeding.     PAST MEDICAL HISTORY:  Past Medical History:   Diagnosis Date   • Arthritis    • Asthma    • Disease of thyroid gland        PAST SURGICAL HISTORY:  Past Surgical History:   Procedure Laterality Date   • BACK SURGERY     •  SECTION     • CHOLECYSTECTOMY     • HIP SURGERY     • KNEE SURGERY      right in  and left in    • LUNG SURGERY     • LUNG SURGERY      right lung   • SHOULDER SURGERY         • THYROIDECTOMY         FAMILY HISTORY:  Family History   Problem Relation Age of Onset   • Hypertension Father    • Arthritis Father    • Asthma Father    • Cancer Father        SOCIAL HISTORY:  Social History     Social History   • Marital status:      Spouse name: N/A   • Number of children: N/A   • Years of education: N/A     Occupational History   • Not on file.     Social History Main Topics   • Smoking status: Never Smoker   • Smokeless tobacco: Never Used   • Alcohol use No   • Drug use: No   • Sexual activity: No     Other Topics Concern   • Not on file     Social History Narrative   • No narrative on file       MEDICATIONS:  The current medication list was reviewed in the EMR    Current Outpatient Prescriptions:   •  Cholecalciferol (VITAMIN D PO), Take  by mouth., Disp: , Rfl:   •  Cyanocobalamin (VITAMIN B-12 PO), Take  by mouth., Disp: , Rfl:   •  HYDROcodone-acetaminophen (NORCO) 5-325 MG per tablet, Take 1 tablet by mouth every 6 (six) hours as needed., Disp: , Rfl:   •  levothyroxine (SYNTHROID, LEVOTHROID) 137 MCG tablet, , Disp: , Rfl: 5  •  XARELTO 20 MG tablet, , Disp: , Rfl: 2    ALLERGIES:  No Known Allergies    REVIEW OF SYSTEMS:    A comprehensive 14 point review of systems was performed.  Significant findings  as mentioned above.  All other systems reviewed and are negative.      Physical Exam   Vital Signs: /81   Pulse 70   Temp 98.5 °F (36.9 °C) (Oral)   Resp 18   Wt 100 kg (220 lb 6.4 oz)   SpO2 95%   BMI 35.57 kg/m²   General: Well developed, well nourished, alert and oriented x 3, in no acute distress.   Head: ATNC   Eyes: PERRL, No evidence of conjunctivitis.   Nose: No nasal discharge.   Mouth: Oral mucosal membranes moist. No oral ulceration or hemorrhages.   Neck: Neck supple. No thyromegaly. No JVD.   Lungs: Clear in all fields to A&P without rales, rhonchi or wheezing.   Heart: S1, S2. Regular rate and rhythm. No murmurs, rubs, or gallops.   Abdomen: Soft. Bowel sounds are normoactive. Nontender with palpation. No Hepatosplenomegaly can be appreciated.   Extremities: No clubbing, cyanosis or edema. Peripheral pulses palpable and equal bilaterally.   Neurologic:  Grossly non-focal exam    IMAGING:    CT Chest 11/16/17  IMPRESSION:     1. Thin linear type filling defects of the bilateral lower lobe proximal  pulmonary segmental arteries with an appearance most consistent with  chronic nonocclusive emboli.  2. No large central emboli identified and no evidence of parenchymal  infarct or right heart strain.  3. Post surgical changes right lower lobe.  4. Linear atelectasis. Otherwise no acute thoracic findings identified.    Bilateral Venous Doppler 10/20/17  Right: No evidence of acute DVT.  Left: Acute nonocclusive DVT identified in the CFV, and subacute nonocclusive DVT in the popliteal and posterior tibial veins. Venous valvular insufficiency is noted at all levels of the LSV.    RECENT LABS:  Lab Results   Component Value Date    WBC 5.69 01/28/2018    HGB 14.0 01/28/2018    HCT 42.3 01/28/2018    MCV 91.6 01/28/2018    RDW 12.8 01/28/2018     01/28/2018    NEUTRORELPCT 58.1 01/28/2018    LYMPHORELPCT 27.6 01/28/2018    MONORELPCT 7.4 01/28/2018    EOSRELPCT 6.2 (H) 01/28/2018    BASORELPCT  0.5 01/28/2018    NEUTROABS 3.31 01/28/2018    LYMPHSABS 1.57 01/28/2018       Lab Results   Component Value Date     01/28/2018    K 4.2 01/28/2018    CO2 29.5 01/28/2018     01/28/2018    BUN 8 01/28/2018    CREATININE 0.72 01/28/2018    EGFRIFNONA 83 01/28/2018    EGFRIFAFRI  09/08/2016      Comment:      <15 Indicative of kidney failure.    GLUCOSE 101 01/28/2018    CALCIUM 8.9 01/28/2018    ALKPHOS 85 01/28/2018    AST 26 01/28/2018    ALT 32 01/28/2018    BILITOT 0.6 01/28/2018    ALBUMIN 4.00 01/28/2018    PROTEINTOT 6.9 01/28/2018     Hypercoagulable workup 12/14/17:  AntiThromb III Func 75 - 135 % 126     Antithrombin Antigen 72 - 124 % 91        Ref Range & Units 4wk ago     Anticardiolipin IgG 0 - 14 GPL U/mL <9   Comments:                           Negative:              <15                             Indeterminate:     15 - 20                             Low-Med Positive: >20 - 80                             High Positive:         >80   Anticardiolipin IgM 0 - 12 MPL U/mL 10   Comments:                           Negative:              <13                             Indeterminate:     13 - 20                             Low-Med Positive: >20 - 80                             High Positive:         >80   Anticardiolipin IgA 0 - 11 APL U/mL <9   Comments:                           Negative:              <12                             Indeterminate:     12 - 20                             Low-Med Positive: >20 - 80                             High Positive:         >80   Antiphosphatidylserine IgM 0 - 25 MPS IgM 7   Antiphosphatidylserine IgA 0 - 20 APS IgA 1   Antiphosphatidylserine IgG 0 - 11 GPS IgG 1     Beta-2 Glyco 1 IgG 0 - 20 GPI IgG units <9   Comments: The reference interval reflects a 3SD or 99th percentile interval,   which is thought to represent a potentially clinically significant   result in accordance with the International Consensus Statement on   the classification criteria  for definitive antiphospholipid syndrome   (APS). J Thromb Haem 2006;4:295-306.   Beta-2 Glyco 1 IgA 0 - 25 GPI IgA units <9   Comments: The reference interval reflects a 3SD or 99th percentile interval,   which is thought to represent a potentially clinically significant   result in accordance with the International Consensus Statement on   the classification criteria for definitive antiphospholipid syndrome   (APS). J Thromb Haem 2006;4:295-306.   Beta-2 Glyco 1 IgM 0 - 32 GPI IgM units <9   Comments: The reference interval reflects a 3SD or 99th percentile interval,   which is thought to represent a potentially clinically significant   result in accordance with the International Consensus Statement on   the classification criteria for definitive antiphospholipid syndrome   (APS). J Thromb Haem 2006;4:295-306     PTT Lupus Anticoagulant 0.0 - 51.9 sec 40.2   Dilute Viper Venom Time 0.0 - 47.0 sec 103.5 (H)   Lupus Anticoagulant Reflex  Comment:   Comments: Results are consistent with the presence of a lupus anticoagulant.   NOTE: Only persistent lupus anticoagulants are thought to be of clinical   significance. For this reason, repeat testing in 12 or more weeks after an   initial positive result should be considered to confirm or refute the   presence of a lupus anticoagulant, depending on clinical presentation.   Results of lupus anticoagulant tests may be falsely positive in the   presence of certain anticoagulant therapies.   Resulting Agency  [facility]   Narrative   Performed at:  [address] [place] [address]  [id number]  : Geovanny Werner MD, Phone:  [phone]      Specimen Collected: 12/14/17  2:38 PM Last Resulted: 12/16/17  4:09 AM                            Ref Range & Units 4wk ago     Dilute Viper Venom 1:1 Mix 0.0 - 47.0 sec 73.8 (H)   Resulting Agency  [facility]   Narrative   Performed at:  [address] [place] [address]  [id number]  Lab  "Director: Geovanny Werner MD, Phone:  [phone]      Specimen Collected: 12/14/17  2:38 PM Last Resulted: 12/16/17  4:09 AM                            Ref Range & Units 4wk ago     Dilute Viper Venom Time 0.8 - 1.2 ratio 1.6 (H)              Ref Range & Units 4wk ago     Protein C-Functional 73 - 180 % 141   Protein S-Functional 63 - 140 % 163 (H)        Ref Range & Units 4wk ago     Protein C Antigen 60 - 150 % 109   Protein S Ag, Total 60 - 150 % 95   Comments: This test was developed and its performance characteristics   determined by [facility]. It has not been cleared or approved   by the Food and Drug Administration.   Protein S Ag, Free 57 - 157 % 120     Factor II, DNA Analysis Comment   Comments: NEGATIVE   No mutation identified     Factor V Leiden Comment   Comments: Result:  Negative (no mutation found)      ASSESSMENT & PLAN:  Wendi Washington is a very pleasant 57 y.o. female with    1.  LLE DVT and bilateral pulmonary emboli:  -Venous doppler from October 2017 revealed acute nonocclusive DVT in the CFV and subacute nonocclusive DVT in the popliteal and posterior tibial veins. RLE venous doppler was negative. CT chest from November 2017 revealed bilateral nonocclusive emboli. She was started on Xarelto per protocol in October 2017 and has been tolerating this well.   She also reports possible PE in 2000 during \"exploratory lung surgery\" at [facility] but denies receiving any anticoagulation at that time. Tried to request records but unable to obtain.   -Based on history above, current DVT/PE likely unprovoked. Therefore, we obtained hypercoagulable workup for further evaluation including Factor V Leiden, Protein C and S, Lupus anticoagulant, Beta 2 Glycoprotein assay, Anticardiolipin antibodies, Prothrombin gene mutation and Antithrombin panel. Overall, workup was unremarkable other than the exception of increased Protein S which carries no clinical significance. Also, noted increased lupus anticoagulant. D/w " patient that only persistent lupus anticoagulants are considered to be of clinical significance. Results can also be falsely positive in the presence of anticoagulant therapies. Therefore, we repeated this yesterday and pending, will follow up with results.    - Given that we were unable to obtain records, it is unclear whether patient truly had previous history of PE but again, most recent DVT/PE is likely unprovoked, etiology unknown. Initial workup also showed positive lupus anticoagulant which we have repeated. If this is again positive, d/w patient that this could possibly have contributed her to her recent DVT/PE. Regardless, based on risks/benefits, would recommend patient continue anticoagulation lifelong and she is in agreement.   -Advised patient to follow up with her PCP for continued monitoring and we will be happy to follow up in the future if needed.     ACO Quality measures  - Wendi Washington does not use tobacco products.  - Patient's Body mass index is 35.57 kg/m². BMI is above normal parameters. Follow-up plan includes:  referral to primary care.  - Current outpatient and discharge medications have been reconciled for the patient.  LEBRON Sanchez    The patient was in agreement with the plan and all questions were answered to her satisfaction.     Thank you so much for allowing us to participate in the care of Wendi Washington . Please do not hesitate to contact us with any questions or concerns.     I spent 15 minutes with Wendi Washington today. More than 50% of the time was spent in counseling / coordination of care for the above problems.    Electronically Signed by: LEBRON Sanchez , April 12, 2018 3:32 PM     CC:   Corey Melton MD

## 2018-04-13 LAB
LA NT PLATELET PPP: 30 SEC (ref 0–51.9)
LUPUS ANTICOAGULANT REFLEX: NORMAL
SCREEN DRVVT: 35.9 SEC (ref 0–47)

## 2018-11-27 ENCOUNTER — TRANSCRIBE ORDERS (OUTPATIENT)
Dept: ADMINISTRATIVE | Facility: HOSPITAL | Age: 58
End: 2018-11-27

## 2018-11-27 ENCOUNTER — LAB (OUTPATIENT)
Dept: LAB | Facility: HOSPITAL | Age: 58
End: 2018-11-27
Attending: INTERNAL MEDICINE

## 2018-11-27 DIAGNOSIS — E78.5 HYPERLIPIDEMIA, UNSPECIFIED HYPERLIPIDEMIA TYPE: ICD-10-CM

## 2018-11-27 DIAGNOSIS — R53.82 CHRONIC FATIGUE AND MALAISE: ICD-10-CM

## 2018-11-27 DIAGNOSIS — R53.81 CHRONIC FATIGUE AND MALAISE: ICD-10-CM

## 2018-11-27 DIAGNOSIS — E11.9 DIABETES MELLITUS WITHOUT COMPLICATION (HCC): Primary | ICD-10-CM

## 2018-11-27 DIAGNOSIS — I10 BENIGN HYPERTENSION: ICD-10-CM

## 2018-11-27 DIAGNOSIS — E11.9 DIABETES MELLITUS WITHOUT COMPLICATION (HCC): ICD-10-CM

## 2018-11-27 LAB
ALBUMIN SERPL-MCNC: 4.3 G/DL (ref 3.5–5)
ALBUMIN/GLOB SERPL: 1.4 G/DL (ref 1.5–2.5)
ALP SERPL-CCNC: 98 U/L (ref 35–104)
ALT SERPL W P-5'-P-CCNC: 30 U/L (ref 10–36)
ANION GAP SERPL CALCULATED.3IONS-SCNC: 5.7 MMOL/L (ref 3.6–11.2)
AST SERPL-CCNC: 23 U/L (ref 10–30)
BASOPHILS # BLD AUTO: 0.03 10*3/MM3 (ref 0–0.3)
BASOPHILS NFR BLD AUTO: 0.6 % (ref 0–2)
BILIRUB SERPL-MCNC: 0.4 MG/DL (ref 0.2–1.8)
BUN BLD-MCNC: 14 MG/DL (ref 7–21)
BUN/CREAT SERPL: 20.3 (ref 7–25)
CALCIUM SPEC-SCNC: 9.2 MG/DL (ref 7.7–10)
CHLORIDE SERPL-SCNC: 109 MMOL/L (ref 99–112)
CHOLEST SERPL-MCNC: 187 MG/DL (ref 0–200)
CO2 SERPL-SCNC: 27.3 MMOL/L (ref 24.3–31.9)
CREAT BLD-MCNC: 0.69 MG/DL (ref 0.43–1.29)
DEPRECATED RDW RBC AUTO: 42.4 FL (ref 37–54)
EOSINOPHIL # BLD AUTO: 0.27 10*3/MM3 (ref 0–0.7)
EOSINOPHIL NFR BLD AUTO: 5.2 % (ref 0–5)
ERYTHROCYTE [DISTWIDTH] IN BLOOD BY AUTOMATED COUNT: 12.7 % (ref 11.5–14.5)
GFR SERPL CREATININE-BSD FRML MDRD: 87 ML/MIN/1.73
GLOBULIN UR ELPH-MCNC: 3 GM/DL
GLUCOSE BLD-MCNC: 106 MG/DL (ref 70–110)
HBA1C MFR BLD: 6.4 % (ref 4.5–5.7)
HCT VFR BLD AUTO: 42.2 % (ref 37–47)
HDLC SERPL-MCNC: 44 MG/DL (ref 60–100)
HGB BLD-MCNC: 13.9 G/DL (ref 12–16)
IMM GRANULOCYTES # BLD: 0 10*3/MM3 (ref 0–0.03)
IMM GRANULOCYTES NFR BLD: 0 % (ref 0–0.5)
LDLC SERPL CALC-MCNC: 117 MG/DL (ref 0–100)
LDLC/HDLC SERPL: 2.67 {RATIO}
LYMPHOCYTES # BLD AUTO: 1.43 10*3/MM3 (ref 1–3)
LYMPHOCYTES NFR BLD AUTO: 27.3 % (ref 21–51)
MCH RBC QN AUTO: 30.3 PG (ref 27–33)
MCHC RBC AUTO-ENTMCNC: 32.9 G/DL (ref 33–37)
MCV RBC AUTO: 91.9 FL (ref 80–94)
MONOCYTES # BLD AUTO: 0.5 10*3/MM3 (ref 0.1–0.9)
MONOCYTES NFR BLD AUTO: 9.6 % (ref 0–10)
NEUTROPHILS # BLD AUTO: 3 10*3/MM3 (ref 1.4–6.5)
NEUTROPHILS NFR BLD AUTO: 57.3 % (ref 30–70)
OSMOLALITY SERPL CALC.SUM OF ELEC: 284 MOSM/KG (ref 273–305)
PLATELET # BLD AUTO: 293 10*3/MM3 (ref 130–400)
PMV BLD AUTO: 9.6 FL (ref 6–10)
POTASSIUM BLD-SCNC: 4.2 MMOL/L (ref 3.5–5.3)
PROT SERPL-MCNC: 7.3 G/DL (ref 6–8)
RBC # BLD AUTO: 4.59 10*6/MM3 (ref 4.2–5.4)
SODIUM BLD-SCNC: 142 MMOL/L (ref 135–153)
TRIGL SERPL-MCNC: 128 MG/DL (ref 0–150)
VLDLC SERPL-MCNC: 25.6 MG/DL
WBC NRBC COR # BLD: 5.23 10*3/MM3 (ref 4.5–12.5)

## 2018-11-27 PROCEDURE — 83036 HEMOGLOBIN GLYCOSYLATED A1C: CPT

## 2018-11-27 PROCEDURE — 36415 COLL VENOUS BLD VENIPUNCTURE: CPT

## 2018-11-27 PROCEDURE — 80061 LIPID PANEL: CPT

## 2018-11-27 PROCEDURE — 85025 COMPLETE CBC W/AUTO DIFF WBC: CPT

## 2018-11-27 PROCEDURE — 80053 COMPREHEN METABOLIC PANEL: CPT

## 2019-03-21 ENCOUNTER — TRANSCRIBE ORDERS (OUTPATIENT)
Dept: ADMINISTRATIVE | Facility: HOSPITAL | Age: 59
End: 2019-03-21

## 2019-03-21 DIAGNOSIS — I82.592 CHRONIC DEEP VEIN THROMBOSIS (DVT) OF OTHER VEIN OF LEFT LOWER EXTREMITY (HCC): Primary | ICD-10-CM

## 2019-03-21 DIAGNOSIS — I27.82 OTHER CHRONIC PULMONARY EMBOLISM WITHOUT ACUTE COR PULMONALE (HCC): ICD-10-CM

## 2019-03-28 ENCOUNTER — APPOINTMENT (OUTPATIENT)
Dept: CT IMAGING | Facility: HOSPITAL | Age: 59
End: 2019-03-28

## 2020-07-28 ENCOUNTER — TRANSCRIBE ORDERS (OUTPATIENT)
Dept: ADMINISTRATIVE | Facility: HOSPITAL | Age: 60
End: 2020-07-28

## 2020-07-28 DIAGNOSIS — M79.604 RIGHT LEG PAIN: Primary | ICD-10-CM

## 2020-08-27 ENCOUNTER — TRANSCRIBE ORDERS (OUTPATIENT)
Dept: ADMINISTRATIVE | Facility: HOSPITAL | Age: 60
End: 2020-08-27

## 2020-08-27 ENCOUNTER — LAB (OUTPATIENT)
Dept: LAB | Facility: HOSPITAL | Age: 60
End: 2020-08-27

## 2020-08-27 DIAGNOSIS — E53.8 DEFICIENCY IN THE VITAMIN FOLIC ACID: ICD-10-CM

## 2020-08-27 DIAGNOSIS — E03.9 HYPOTHYROIDISM, UNSPECIFIED TYPE: Primary | ICD-10-CM

## 2020-08-27 DIAGNOSIS — E78.2 MIXED HYPERLIPIDEMIA: ICD-10-CM

## 2020-08-27 DIAGNOSIS — E53.9 VITAMIN B DEFICIENCY: ICD-10-CM

## 2020-08-27 DIAGNOSIS — E03.9 HYPOTHYROIDISM, UNSPECIFIED TYPE: ICD-10-CM

## 2020-08-27 DIAGNOSIS — E55.9 VITAMIN D DEFICIENCY: ICD-10-CM

## 2020-08-27 LAB
25(OH)D3 SERPL-MCNC: 59.4 NG/ML (ref 30–100)
ALBUMIN SERPL-MCNC: 4 G/DL (ref 3.5–5.2)
ALBUMIN/GLOB SERPL: 1.3 G/DL
ALP SERPL-CCNC: 69 U/L (ref 39–117)
ALT SERPL W P-5'-P-CCNC: 15 U/L (ref 1–33)
ANION GAP SERPL CALCULATED.3IONS-SCNC: 10.2 MMOL/L (ref 5–15)
AST SERPL-CCNC: 18 U/L (ref 1–32)
BASOPHILS # BLD AUTO: 0.03 10*3/MM3 (ref 0–0.2)
BASOPHILS NFR BLD AUTO: 0.6 % (ref 0–1.5)
BILIRUB SERPL-MCNC: 0.6 MG/DL (ref 0–1.2)
BUN SERPL-MCNC: 14 MG/DL (ref 8–23)
BUN/CREAT SERPL: 16.3 (ref 7–25)
CALCIUM SPEC-SCNC: 9.4 MG/DL (ref 8.6–10.5)
CHLORIDE SERPL-SCNC: 107 MMOL/L (ref 98–107)
CHOLEST SERPL-MCNC: 199 MG/DL (ref 0–200)
CO2 SERPL-SCNC: 23.8 MMOL/L (ref 22–29)
CREAT SERPL-MCNC: 0.86 MG/DL (ref 0.57–1)
DEPRECATED RDW RBC AUTO: 42.7 FL (ref 37–54)
EOSINOPHIL # BLD AUTO: 0.23 10*3/MM3 (ref 0–0.4)
EOSINOPHIL NFR BLD AUTO: 4.6 % (ref 0.3–6.2)
ERYTHROCYTE [DISTWIDTH] IN BLOOD BY AUTOMATED COUNT: 12.4 % (ref 12.3–15.4)
FOLATE SERPL-MCNC: 12.7 NG/ML (ref 4.78–24.2)
GFR SERPL CREATININE-BSD FRML MDRD: 67 ML/MIN/1.73
GLOBULIN UR ELPH-MCNC: 3.1 GM/DL
GLUCOSE SERPL-MCNC: 123 MG/DL (ref 65–99)
HBA1C MFR BLD: 5.9 % (ref 4.8–5.6)
HCT VFR BLD AUTO: 42.5 % (ref 34–46.6)
HDLC SERPL-MCNC: 42 MG/DL (ref 40–60)
HGB BLD-MCNC: 14.2 G/DL (ref 12–15.9)
IMM GRANULOCYTES # BLD AUTO: 0.01 10*3/MM3 (ref 0–0.05)
IMM GRANULOCYTES NFR BLD AUTO: 0.2 % (ref 0–0.5)
LDLC SERPL CALC-MCNC: 124 MG/DL (ref 0–100)
LDLC/HDLC SERPL: 2.96 {RATIO}
LYMPHOCYTES # BLD AUTO: 1.53 10*3/MM3 (ref 0.7–3.1)
LYMPHOCYTES NFR BLD AUTO: 30.4 % (ref 19.6–45.3)
MCH RBC QN AUTO: 31.1 PG (ref 26.6–33)
MCHC RBC AUTO-ENTMCNC: 33.4 G/DL (ref 31.5–35.7)
MCV RBC AUTO: 93.2 FL (ref 79–97)
MONOCYTES # BLD AUTO: 0.42 10*3/MM3 (ref 0.1–0.9)
MONOCYTES NFR BLD AUTO: 8.3 % (ref 5–12)
NEUTROPHILS NFR BLD AUTO: 2.81 10*3/MM3 (ref 1.7–7)
NEUTROPHILS NFR BLD AUTO: 55.9 % (ref 42.7–76)
NRBC BLD AUTO-RTO: 0 /100 WBC (ref 0–0.2)
PLATELET # BLD AUTO: 264 10*3/MM3 (ref 140–450)
PMV BLD AUTO: 10.2 FL (ref 6–12)
POTASSIUM SERPL-SCNC: 4.3 MMOL/L (ref 3.5–5.2)
PROT SERPL-MCNC: 7.1 G/DL (ref 6–8.5)
RBC # BLD AUTO: 4.56 10*6/MM3 (ref 3.77–5.28)
SODIUM SERPL-SCNC: 141 MMOL/L (ref 136–145)
T4 FREE SERPL-MCNC: 1.13 NG/DL (ref 0.93–1.7)
TRIGL SERPL-MCNC: 164 MG/DL (ref 0–150)
TSH SERPL DL<=0.05 MIU/L-ACNC: 1.36 UIU/ML (ref 0.27–4.2)
VIT B12 BLD-MCNC: 484 PG/ML (ref 211–946)
VLDLC SERPL-MCNC: 32.8 MG/DL (ref 5–40)
WBC # BLD AUTO: 5.03 10*3/MM3 (ref 3.4–10.8)

## 2020-08-27 PROCEDURE — 80053 COMPREHEN METABOLIC PANEL: CPT

## 2020-08-27 PROCEDURE — 82306 VITAMIN D 25 HYDROXY: CPT

## 2020-08-27 PROCEDURE — 80061 LIPID PANEL: CPT

## 2020-08-27 PROCEDURE — 85025 COMPLETE CBC W/AUTO DIFF WBC: CPT

## 2020-08-27 PROCEDURE — 82746 ASSAY OF FOLIC ACID SERUM: CPT

## 2020-08-27 PROCEDURE — 84443 ASSAY THYROID STIM HORMONE: CPT

## 2020-08-27 PROCEDURE — 83036 HEMOGLOBIN GLYCOSYLATED A1C: CPT

## 2020-08-27 PROCEDURE — 36415 COLL VENOUS BLD VENIPUNCTURE: CPT

## 2020-08-27 PROCEDURE — 84439 ASSAY OF FREE THYROXINE: CPT

## 2020-08-27 PROCEDURE — 82607 VITAMIN B-12: CPT

## 2020-11-05 ENCOUNTER — APPOINTMENT (OUTPATIENT)
Dept: BONE DENSITY | Facility: HOSPITAL | Age: 60
End: 2020-11-05

## 2021-02-22 DIAGNOSIS — Z23 IMMUNIZATION DUE: ICD-10-CM

## 2021-04-21 ENCOUNTER — TRANSCRIBE ORDERS (OUTPATIENT)
Dept: ADMINISTRATIVE | Facility: HOSPITAL | Age: 61
End: 2021-04-21

## 2021-04-21 ENCOUNTER — LAB (OUTPATIENT)
Dept: LAB | Facility: HOSPITAL | Age: 61
End: 2021-04-21

## 2021-04-21 DIAGNOSIS — E53.9 VITAMIN B DEFICIENCY, UNSPECIFIED: ICD-10-CM

## 2021-04-21 DIAGNOSIS — E53.8 VITAMIN B12 DEFICIENCY (NON ANEMIC): ICD-10-CM

## 2021-04-21 DIAGNOSIS — E03.9 HYPOTHYROIDISM, UNSPECIFIED TYPE: Primary | ICD-10-CM

## 2021-04-21 DIAGNOSIS — E78.2 MIXED HYPERLIPIDEMIA: ICD-10-CM

## 2021-04-21 DIAGNOSIS — E55.9 VITAMIN D DEFICIENCY: ICD-10-CM

## 2021-04-21 DIAGNOSIS — E03.9 HYPOTHYROIDISM, UNSPECIFIED TYPE: ICD-10-CM

## 2021-04-21 LAB
25(OH)D3 SERPL-MCNC: 65.4 NG/ML
ALBUMIN SERPL-MCNC: 4.1 G/DL (ref 3.5–5.2)
ALBUMIN/GLOB SERPL: 1.4 G/DL
ALP SERPL-CCNC: 83 U/L (ref 39–117)
ALT SERPL W P-5'-P-CCNC: 19 U/L (ref 1–33)
ANION GAP SERPL CALCULATED.3IONS-SCNC: 9.7 MMOL/L (ref 5–15)
AST SERPL-CCNC: 16 U/L (ref 1–32)
BASOPHILS # BLD AUTO: 0.03 10*3/MM3 (ref 0–0.2)
BASOPHILS NFR BLD AUTO: 0.5 % (ref 0–1.5)
BILIRUB SERPL-MCNC: 0.4 MG/DL (ref 0–1.2)
BUN SERPL-MCNC: 13 MG/DL (ref 8–23)
BUN/CREAT SERPL: 16.9 (ref 7–25)
CALCIUM SPEC-SCNC: 9.4 MG/DL (ref 8.6–10.5)
CHLORIDE SERPL-SCNC: 104 MMOL/L (ref 98–107)
CHOLEST SERPL-MCNC: 216 MG/DL (ref 0–200)
CO2 SERPL-SCNC: 26.3 MMOL/L (ref 22–29)
CREAT SERPL-MCNC: 0.77 MG/DL (ref 0.57–1)
DEPRECATED RDW RBC AUTO: 41.6 FL (ref 37–54)
EOSINOPHIL # BLD AUTO: 0.2 10*3/MM3 (ref 0–0.4)
EOSINOPHIL NFR BLD AUTO: 3.4 % (ref 0.3–6.2)
ERYTHROCYTE [DISTWIDTH] IN BLOOD BY AUTOMATED COUNT: 13 % (ref 12.3–15.4)
FOLATE SERPL-MCNC: 11.2 NG/ML (ref 4.78–24.2)
GFR SERPL CREATININE-BSD FRML MDRD: 76 ML/MIN/1.73
GLOBULIN UR ELPH-MCNC: 2.9 GM/DL
GLUCOSE SERPL-MCNC: 118 MG/DL (ref 65–99)
HBA1C MFR BLD: 6.2 % (ref 4.8–5.6)
HCT VFR BLD AUTO: 42.4 % (ref 34–46.6)
HDLC SERPL-MCNC: 55 MG/DL (ref 40–60)
HGB BLD-MCNC: 14.7 G/DL (ref 12–15.9)
IMM GRANULOCYTES # BLD AUTO: 0.02 10*3/MM3 (ref 0–0.05)
IMM GRANULOCYTES NFR BLD AUTO: 0.3 % (ref 0–0.5)
LDLC SERPL CALC-MCNC: 143 MG/DL (ref 0–100)
LDLC/HDLC SERPL: 2.57 {RATIO}
LYMPHOCYTES # BLD AUTO: 1.65 10*3/MM3 (ref 0.7–3.1)
LYMPHOCYTES NFR BLD AUTO: 28.3 % (ref 19.6–45.3)
MCH RBC QN AUTO: 31.1 PG (ref 26.6–33)
MCHC RBC AUTO-ENTMCNC: 34.7 G/DL (ref 31.5–35.7)
MCV RBC AUTO: 89.8 FL (ref 79–97)
MONOCYTES # BLD AUTO: 0.47 10*3/MM3 (ref 0.1–0.9)
MONOCYTES NFR BLD AUTO: 8 % (ref 5–12)
NEUTROPHILS NFR BLD AUTO: 3.47 10*3/MM3 (ref 1.7–7)
NEUTROPHILS NFR BLD AUTO: 59.5 % (ref 42.7–76)
NRBC BLD AUTO-RTO: 0 /100 WBC (ref 0–0.2)
PLATELET # BLD AUTO: 346 10*3/MM3 (ref 140–450)
PMV BLD AUTO: 9.6 FL (ref 6–12)
POTASSIUM SERPL-SCNC: 4.4 MMOL/L (ref 3.5–5.2)
PROT SERPL-MCNC: 7 G/DL (ref 6–8.5)
RBC # BLD AUTO: 4.72 10*6/MM3 (ref 3.77–5.28)
SODIUM SERPL-SCNC: 140 MMOL/L (ref 136–145)
T4 FREE SERPL-MCNC: 1.38 NG/DL (ref 0.93–1.7)
TRIGL SERPL-MCNC: 99 MG/DL (ref 0–150)
TSH SERPL DL<=0.05 MIU/L-ACNC: 0.81 UIU/ML (ref 0.27–4.2)
VIT B12 BLD-MCNC: 1845 PG/ML (ref 211–946)
VLDLC SERPL-MCNC: 18 MG/DL (ref 5–40)
WBC # BLD AUTO: 5.84 10*3/MM3 (ref 3.4–10.8)

## 2021-04-21 PROCEDURE — 82746 ASSAY OF FOLIC ACID SERUM: CPT

## 2021-04-21 PROCEDURE — 82306 VITAMIN D 25 HYDROXY: CPT

## 2021-04-21 PROCEDURE — 84443 ASSAY THYROID STIM HORMONE: CPT

## 2021-04-21 PROCEDURE — 84439 ASSAY OF FREE THYROXINE: CPT

## 2021-04-21 PROCEDURE — 36415 COLL VENOUS BLD VENIPUNCTURE: CPT

## 2021-04-21 PROCEDURE — 80061 LIPID PANEL: CPT

## 2021-04-21 PROCEDURE — 82607 VITAMIN B-12: CPT

## 2021-04-21 PROCEDURE — 83036 HEMOGLOBIN GLYCOSYLATED A1C: CPT

## 2021-04-21 PROCEDURE — 80053 COMPREHEN METABOLIC PANEL: CPT

## 2021-04-21 PROCEDURE — 85025 COMPLETE CBC W/AUTO DIFF WBC: CPT

## 2021-05-04 ENCOUNTER — APPOINTMENT (OUTPATIENT)
Dept: CT IMAGING | Facility: HOSPITAL | Age: 61
End: 2021-05-04

## 2021-05-04 ENCOUNTER — HOSPITAL ENCOUNTER (EMERGENCY)
Facility: HOSPITAL | Age: 61
Discharge: HOME | End: 2021-05-04
Attending: EMERGENCY MEDICINE | Admitting: EMERGENCY MEDICINE

## 2021-05-04 VITALS
HEIGHT: 66 IN | DIASTOLIC BLOOD PRESSURE: 77 MMHG | SYSTOLIC BLOOD PRESSURE: 123 MMHG | RESPIRATION RATE: 18 BRPM | TEMPERATURE: 98.9 F | OXYGEN SATURATION: 97 % | WEIGHT: 228 LBS | BODY MASS INDEX: 36.64 KG/M2 | HEART RATE: 72 BPM

## 2021-05-04 DIAGNOSIS — N39.0 ACUTE LOWER UTI: ICD-10-CM

## 2021-05-04 DIAGNOSIS — N20.1 URETEROLITHIASIS: Primary | ICD-10-CM

## 2021-05-04 LAB
ALBUMIN SERPL-MCNC: 4.16 G/DL (ref 3.5–5.2)
ALBUMIN/GLOB SERPL: 1.4 G/DL
ALP SERPL-CCNC: 102 U/L (ref 39–117)
ALT SERPL W P-5'-P-CCNC: 10 U/L (ref 1–33)
ANION GAP SERPL CALCULATED.3IONS-SCNC: 9.4 MMOL/L (ref 5–15)
AST SERPL-CCNC: 20 U/L (ref 1–32)
BACTERIA UR QL AUTO: ABNORMAL /HPF
BASOPHILS # BLD AUTO: 0.05 10*3/MM3 (ref 0–0.2)
BASOPHILS NFR BLD AUTO: 0.5 % (ref 0–1.5)
BILIRUB SERPL-MCNC: 0.4 MG/DL (ref 0–1.2)
BILIRUB UR QL STRIP: NEGATIVE
BUN SERPL-MCNC: 10 MG/DL (ref 8–23)
BUN/CREAT SERPL: 13.3 (ref 7–25)
CALCIUM SPEC-SCNC: 9.5 MG/DL (ref 8.6–10.5)
CHLORIDE SERPL-SCNC: 106 MMOL/L (ref 98–107)
CLARITY UR: CLEAR
CO2 SERPL-SCNC: 28.6 MMOL/L (ref 22–29)
COLOR UR: ABNORMAL
CREAT SERPL-MCNC: 0.75 MG/DL (ref 0.57–1)
CRP SERPL-MCNC: 0.71 MG/DL (ref 0–0.5)
DEPRECATED RDW RBC AUTO: 44.2 FL (ref 37–54)
EOSINOPHIL # BLD AUTO: 0.17 10*3/MM3 (ref 0–0.4)
EOSINOPHIL NFR BLD AUTO: 1.7 % (ref 0.3–6.2)
ERYTHROCYTE [DISTWIDTH] IN BLOOD BY AUTOMATED COUNT: 12.8 % (ref 12.3–15.4)
ERYTHROCYTE [SEDIMENTATION RATE] IN BLOOD: 22 MM/HR (ref 0–30)
GFR SERPL CREATININE-BSD FRML MDRD: 79 ML/MIN/1.73
GLOBULIN UR ELPH-MCNC: 2.9 GM/DL
GLUCOSE SERPL-MCNC: 102 MG/DL (ref 65–99)
GLUCOSE UR STRIP-MCNC: NEGATIVE MG/DL
HCT VFR BLD AUTO: 43.6 % (ref 34–46.6)
HGB BLD-MCNC: 14.2 G/DL (ref 12–15.9)
HGB UR QL STRIP.AUTO: ABNORMAL
HYALINE CASTS UR QL AUTO: ABNORMAL /LPF
IMM GRANULOCYTES # BLD AUTO: 0.04 10*3/MM3 (ref 0–0.05)
IMM GRANULOCYTES NFR BLD AUTO: 0.4 % (ref 0–0.5)
KETONES UR QL STRIP: NEGATIVE
LEUKOCYTE ESTERASE UR QL STRIP.AUTO: ABNORMAL
LYMPHOCYTES # BLD AUTO: 2.72 10*3/MM3 (ref 0.7–3.1)
LYMPHOCYTES NFR BLD AUTO: 27.3 % (ref 19.6–45.3)
MCH RBC QN AUTO: 30.7 PG (ref 26.6–33)
MCHC RBC AUTO-ENTMCNC: 32.6 G/DL (ref 31.5–35.7)
MCV RBC AUTO: 94.2 FL (ref 79–97)
MONOCYTES # BLD AUTO: 0.89 10*3/MM3 (ref 0.1–0.9)
MONOCYTES NFR BLD AUTO: 8.9 % (ref 5–12)
NEUTROPHILS NFR BLD AUTO: 6.11 10*3/MM3 (ref 1.7–7)
NEUTROPHILS NFR BLD AUTO: 61.2 % (ref 42.7–76)
NITRITE UR QL STRIP: NEGATIVE
NRBC BLD AUTO-RTO: 0 /100 WBC (ref 0–0.2)
PH UR STRIP.AUTO: 8 [PH] (ref 5–8)
PLATELET # BLD AUTO: 350 10*3/MM3 (ref 140–450)
PMV BLD AUTO: 9 FL (ref 6–12)
POTASSIUM SERPL-SCNC: 4.2 MMOL/L (ref 3.5–5.2)
PROT SERPL-MCNC: 7.1 G/DL (ref 6–8.5)
PROT UR QL STRIP: ABNORMAL
RBC # BLD AUTO: 4.63 10*6/MM3 (ref 3.77–5.28)
RBC # UR: ABNORMAL /HPF
REF LAB TEST METHOD: ABNORMAL
SODIUM SERPL-SCNC: 144 MMOL/L (ref 136–145)
SP GR UR STRIP: 1.01 (ref 1–1.03)
SQUAMOUS #/AREA URNS HPF: ABNORMAL /HPF
UROBILINOGEN UR QL STRIP: ABNORMAL
WBC # BLD AUTO: 9.98 10*3/MM3 (ref 3.4–10.8)
WBC UR QL AUTO: ABNORMAL /HPF

## 2021-05-04 PROCEDURE — 25010000002 KETOROLAC TROMETHAMINE PER 15 MG: Performed by: EMERGENCY MEDICINE

## 2021-05-04 PROCEDURE — 25010000002 MORPHINE PER 10 MG: Performed by: EMERGENCY MEDICINE

## 2021-05-04 PROCEDURE — 81001 URINALYSIS AUTO W/SCOPE: CPT | Performed by: PHYSICIAN ASSISTANT

## 2021-05-04 PROCEDURE — 85652 RBC SED RATE AUTOMATED: CPT | Performed by: PHYSICIAN ASSISTANT

## 2021-05-04 PROCEDURE — 85025 COMPLETE CBC W/AUTO DIFF WBC: CPT | Performed by: PHYSICIAN ASSISTANT

## 2021-05-04 PROCEDURE — 99284 EMERGENCY DEPT VISIT MOD MDM: CPT

## 2021-05-04 PROCEDURE — 96375 TX/PRO/DX INJ NEW DRUG ADDON: CPT

## 2021-05-04 PROCEDURE — 86140 C-REACTIVE PROTEIN: CPT | Performed by: PHYSICIAN ASSISTANT

## 2021-05-04 PROCEDURE — 74176 CT ABD & PELVIS W/O CONTRAST: CPT | Performed by: RADIOLOGY

## 2021-05-04 PROCEDURE — 74176 CT ABD & PELVIS W/O CONTRAST: CPT

## 2021-05-04 PROCEDURE — 96374 THER/PROPH/DIAG INJ IV PUSH: CPT

## 2021-05-04 PROCEDURE — 80053 COMPREHEN METABOLIC PANEL: CPT | Performed by: PHYSICIAN ASSISTANT

## 2021-05-04 RX ORDER — SODIUM CHLORIDE 0.9 % (FLUSH) 0.9 %
10 SYRINGE (ML) INJECTION AS NEEDED
Status: DISCONTINUED | OUTPATIENT
Start: 2021-05-04 | End: 2021-05-04 | Stop reason: HOSPADM

## 2021-05-04 RX ORDER — ONDANSETRON 4 MG/1
4 TABLET, ORALLY DISINTEGRATING ORAL EVERY 6 HOURS PRN
Qty: 12 TABLET | Refills: 0 | Status: SHIPPED | OUTPATIENT
Start: 2021-05-04 | End: 2021-05-12 | Stop reason: SDUPTHER

## 2021-05-04 RX ORDER — HYDROCODONE BITARTRATE AND ACETAMINOPHEN 5; 325 MG/1; MG/1
1 TABLET ORAL EVERY 6 HOURS PRN
Qty: 12 TABLET | Refills: 0 | Status: SHIPPED | OUTPATIENT
Start: 2021-05-04

## 2021-05-04 RX ORDER — CEPHALEXIN 500 MG/1
500 CAPSULE ORAL 2 TIMES DAILY
Qty: 20 CAPSULE | Refills: 0 | Status: SHIPPED | OUTPATIENT
Start: 2021-05-04 | End: 2021-06-17

## 2021-05-04 RX ORDER — TAMSULOSIN HYDROCHLORIDE 0.4 MG/1
1 CAPSULE ORAL DAILY
Qty: 30 CAPSULE | Refills: 0 | Status: SHIPPED | OUTPATIENT
Start: 2021-05-04 | End: 2022-05-10

## 2021-05-04 RX ORDER — KETOROLAC TROMETHAMINE 30 MG/ML
15 INJECTION, SOLUTION INTRAMUSCULAR; INTRAVENOUS ONCE
Status: COMPLETED | OUTPATIENT
Start: 2021-05-04 | End: 2021-05-04

## 2021-05-04 RX ADMIN — KETOROLAC TROMETHAMINE 15 MG: 30 INJECTION, SOLUTION INTRAMUSCULAR; INTRAVENOUS at 17:40

## 2021-05-04 RX ADMIN — SODIUM CHLORIDE 1000 ML: 9 INJECTION, SOLUTION INTRAVENOUS at 17:39

## 2021-05-04 RX ADMIN — MORPHINE SULFATE 4 MG: 4 INJECTION, SOLUTION INTRAMUSCULAR; INTRAVENOUS at 17:42

## 2021-05-05 ENCOUNTER — OFFICE VISIT (OUTPATIENT)
Dept: UROLOGY | Facility: CLINIC | Age: 61
End: 2021-05-05

## 2021-05-05 VITALS — BODY MASS INDEX: 38.89 KG/M2 | HEIGHT: 66 IN | TEMPERATURE: 97.4 F | WEIGHT: 242 LBS

## 2021-05-05 DIAGNOSIS — N20.1 RIGHT URETERAL CALCULUS: Primary | ICD-10-CM

## 2021-05-05 DIAGNOSIS — N28.1 ACQUIRED RENAL CYST OF LEFT KIDNEY: ICD-10-CM

## 2021-05-05 DIAGNOSIS — R10.9 BILATERAL FLANK PAIN: ICD-10-CM

## 2021-05-05 PROCEDURE — 99204 OFFICE O/P NEW MOD 45 MIN: CPT | Performed by: NURSE PRACTITIONER

## 2021-05-12 ENCOUNTER — OFFICE VISIT (OUTPATIENT)
Dept: UROLOGY | Facility: CLINIC | Age: 61
End: 2021-05-12

## 2021-05-12 ENCOUNTER — HOSPITAL ENCOUNTER (OUTPATIENT)
Dept: GENERAL RADIOLOGY | Facility: HOSPITAL | Age: 61
Discharge: HOME | End: 2021-05-12
Admitting: NURSE PRACTITIONER

## 2021-05-12 VITALS — BODY MASS INDEX: 39.06 KG/M2 | HEIGHT: 66 IN

## 2021-05-12 DIAGNOSIS — R10.9 BILATERAL FLANK PAIN: ICD-10-CM

## 2021-05-12 DIAGNOSIS — N20.1 RIGHT URETERAL CALCULUS: Primary | ICD-10-CM

## 2021-05-12 DIAGNOSIS — R11.0 NAUSEA WITHOUT VOMITING: ICD-10-CM

## 2021-05-12 DIAGNOSIS — K59.00 CONSTIPATION, UNSPECIFIED CONSTIPATION TYPE: ICD-10-CM

## 2021-05-12 DIAGNOSIS — R35.0 FREQUENCY OF URINATION: ICD-10-CM

## 2021-05-12 LAB
BILIRUB BLD-MCNC: NEGATIVE MG/DL
CLARITY, POC: CLEAR
COLOR UR: YELLOW
GLUCOSE UR STRIP-MCNC: NEGATIVE MG/DL
KETONES UR QL: NEGATIVE
LEUKOCYTE EST, POC: NEGATIVE
NITRITE UR-MCNC: NEGATIVE MG/ML
PH UR: 5.5 [PH] (ref 5–8)
PROT UR STRIP-MCNC: NEGATIVE MG/DL
RBC # UR STRIP: ABNORMAL /UL
SP GR UR: 1.02 (ref 1–1.03)
UROBILINOGEN UR QL: NORMAL

## 2021-05-12 PROCEDURE — 81003 URINALYSIS AUTO W/O SCOPE: CPT | Performed by: NURSE PRACTITIONER

## 2021-05-12 PROCEDURE — 74018 RADEX ABDOMEN 1 VIEW: CPT

## 2021-05-12 PROCEDURE — 99214 OFFICE O/P EST MOD 30 MIN: CPT | Performed by: NURSE PRACTITIONER

## 2021-05-12 PROCEDURE — 74018 RADEX ABDOMEN 1 VIEW: CPT | Performed by: RADIOLOGY

## 2021-05-12 RX ORDER — ONDANSETRON 4 MG/1
4 TABLET, ORALLY DISINTEGRATING ORAL EVERY 6 HOURS PRN
Qty: 12 TABLET | Refills: 0 | Status: SHIPPED | OUTPATIENT
Start: 2021-05-12

## 2021-05-12 RX ORDER — POLYETHYLENE GLYCOL 3350 17 G/17G
17 POWDER, FOR SOLUTION ORAL DAILY
Qty: 30 EACH | Refills: 3 | Status: SHIPPED | OUTPATIENT
Start: 2021-05-12 | End: 2021-06-17 | Stop reason: SDUPTHER

## 2021-05-12 RX ORDER — MELOXICAM 7.5 MG/1
15 TABLET ORAL DAILY PRN
Qty: 30 TABLET | Refills: 0 | Status: SHIPPED | OUTPATIENT
Start: 2021-05-12 | End: 2021-06-11

## 2021-05-12 NOTE — PROGRESS NOTES
"Chief Complaint  Right ureteral calculus/right flank pain (1 wk f/u right UPJ stone/constipation)    Subjective          Ami Macias presents to [facility]  History of Present Illness  Ms. AMI MACIAS is a very pleasant 60-year-old female returns to clinic today for 1 week follow-up on a 3 mm right ureteral calculus.  Patient is in no apparent discomfort today and reports doing relatively well.      She has had minimal flank pain, since evaluated in clinic 1 week ago, averaging discomfort of 3 on 10 but denies any pain or discomfort today.  However she is not sure she whether she passed a kidney stone yet.  Denies any urinary symptoms of frequency urgency or dysuria, denies pelvic pressure or suprapubic discomfort.  She has intermittent nausea, denies vomiting, denies chills or fevers.  Her urine dipstick today is negative for any infection, she has 3+ microscopic hematuria, denies any episodes of gross hematuria    Her last CT scan done post ER visit 05/04/2021  showed a Large left renal cyst. Proximal right ureteral stone measuring 3 mm. THERE IS  Mild dilatation of the right renal pelvis and proximal right ureter.     We reviewed/discussed her KUB results today. 2 views of the abdomen show calcification projecting to the right of the L3-4 disc space. This is concerning for a mid right ureteral stone.  Also noted is large volume stool in the colon.    Most likely has not passed a stone.  She is completely asymptomatic, she would like to give it some more time.    Objective   Vital Signs:   Ht 167.6 cm (66\")   BMI 39.06 kg/m²     Physical Exam  Constitutional:       General: She is not in acute distress.     Appearance: She is well-developed. She is obese.   HENT:      Head: Normocephalic and atraumatic.   Eyes:      Pupils: Pupils are equal, round, and reactive to light.   Neck:      Thyroid: No thyromegaly.      Trachea: No tracheal deviation.   Cardiovascular: "      Rate and Rhythm: Normal rate and regular rhythm.      Heart sounds: No murmur heard.     Pulmonary:      Effort: Pulmonary effort is normal. No respiratory distress.      Breath sounds: Normal breath sounds. No stridor. No wheezing.   Abdominal:      General: Bowel sounds are normal. There is distension.      Palpations: Abdomen is soft.      Tenderness: There is abdominal tenderness.   Genitourinary:     Labia:         Right: No tenderness.         Left: No tenderness.       Vagina: Normal. No vaginal discharge.   Musculoskeletal:         General: Tenderness present. No deformity. Normal range of motion.      Cervical back: Normal range of motion.   Skin:     General: Skin is warm and dry.      Coloration: Skin is not pale.      Findings: No erythema or rash.   Neurological:      Mental Status: She is alert and oriented to person, place, and time.      Cranial Nerves: No cranial nerve deficit.      Sensory: No sensory deficit.      Coordination: Coordination normal.   Psychiatric:         Behavior: Behavior normal.         Thought Content: Thought content normal.         Judgment: Judgment normal.        Result Review :     UA    Urinalysis 5/4/21 5/4/21 5/12/21    1906 1906    Squamous Epithelial Cells, UA  3-6 (A)    Specific Gravity, UA 1.008     Ketones, UA Negative  Negative   Blood, UA Large (3+) (A)     Leukocytes, UA Trace (A)  Negative   Nitrite, UA Negative     RBC, UA  Too Numerous to Count (A)    WBC, UA  3-5 (A)    Bacteria, UA  1+ (A)    (A) Abnormal value            Data reviewed: Radiologic studies KUB done today 05/12/2021          Assessment and Plan    Diagnoses and all orders for this visit:    1. Right ureteral calculus (Primary)  -     XR abdomen kub; Future  -     polyethylene glycol (MiraLax) 17 g packet; Take 17 g by mouth Daily.  Dispense: 30 each; Refill: 3  -     ondansetron ODT (ZOFRAN-ODT) 4 MG disintegrating tablet; Place 1 tablet on the tongue Every 6 (Six) Hours As Needed for  Nausea or Vomiting.  Dispense: 12 tablet; Refill: 0  -     meloxicam (Mobic) 7.5 MG tablet; Take 2 tablets by mouth Daily As Needed for Moderate Pain  for up to 30 days.  Dispense: 30 tablet; Refill: 0    2. Constipation, unspecified constipation type  -     polyethylene glycol (MiraLax) 17 g packet; Take 17 g by mouth Daily.  Dispense: 30 each; Refill: 3  -     ondansetron ODT (ZOFRAN-ODT) 4 MG disintegrating tablet; Place 1 tablet on the tongue Every 6 (Six) Hours As Needed for Nausea or Vomiting.  Dispense: 12 tablet; Refill: 0  -     meloxicam (Mobic) 7.5 MG tablet; Take 2 tablets by mouth Daily As Needed for Moderate Pain  for up to 30 days.  Dispense: 30 tablet; Refill: 0    3. Frequency of urination  -     POC Urinalysis Dipstick, Automated  -     XR abdomen kub; Future    4. Bilateral flank pain  -     XR abdomen kub; Future  -     polyethylene glycol (MiraLax) 17 g packet; Take 17 g by mouth Daily.  Dispense: 30 each; Refill: 3  -     ondansetron ODT (ZOFRAN-ODT) 4 MG disintegrating tablet; Place 1 tablet on the tongue Every 6 (Six) Hours As Needed for Nausea or Vomiting.  Dispense: 12 tablet; Refill: 0  -     meloxicam (Mobic) 7.5 MG tablet; Take 2 tablets by mouth Daily As Needed for Moderate Pain  for up to 30 days.  Dispense: 30 tablet; Refill: 0    5. Nausea without vomiting  -     ondansetron ODT (ZOFRAN-ODT) 4 MG disintegrating tablet; Place 1 tablet on the tongue Every 6 (Six) Hours As Needed for Nausea or Vomiting.  Dispense: 12 tablet; Refill: 0          ASSESSMENT  RIGHT UPJ Stone /Flank PainI: The Patient has been diagnosed with a  RIGHT 3MM UPJ calculus. She presents to clinic today in no apparent discomfort and denies any pain to flank area.  She denies any urinary symptoms. We both reviewed, and discussed her repeat KUB done today which showed that her right UPJ 3 mm stone is still in situ.  He has significant moderate to large volume stool burden in the colon.     We have discussed the  various parameters regarding spontaneous passage including the notion that a a tiny 1 -3 mm stone has a 95% high likelihood of spontaneous passage versus a larger stone being caught up in the upper areas of the urinary tract. We also discussed the medical management of stone disease and the use of medical expulsive therapy in the form of Flomax. This is used in an off label setting.   I also talked about nonoperative management including ambulation and increasing fluids and hot tub as being an effective adjuncts in the treatment of a ureteral stone.     We discussed the indicators for intervention including  absolute indicators such as sepsis and uncontrollable severe pain as well as  the relative indicators of moderate pain that is well-controlled with various analgesia.    I Discussed this case with Dr Taylor, who is agreeable to the plan.      She has adequate narcotic pain control -hydrocodone/acetaminophen 10/325 from the ED, also gave her refill of pain medication-meloxicam daily as needed to use in between for breakthrough pain, just in case and Nausea medication and Flomax for medical expulsive therapy.    Continue Keflex 500 mg mg twice daily x10 days-UTI    Zofran 4 mg p.o. every 6 hours as needed-nausea    Encourage daily bowel regimen especially MiraLAX - Post Linzess given to try    We will follow-up in 2 weeks with repeat KUB    Patient are agreeable to plan of care      Follow Up   Return in about 2 weeks (around 5/26/2021) for Next scheduled follow up with kub prior.  Patient was given instructions and counseling regarding her condition or for health maintenance advice. Please see specific information pulled into the AVS if appropriate.

## 2021-06-17 ENCOUNTER — OFFICE VISIT (OUTPATIENT)
Dept: UROLOGY | Facility: CLINIC | Age: 61
End: 2021-06-17

## 2021-06-17 ENCOUNTER — HOSPITAL ENCOUNTER (OUTPATIENT)
Dept: GENERAL RADIOLOGY | Facility: HOSPITAL | Age: 61
Discharge: HOME | End: 2021-06-17
Admitting: NURSE PRACTITIONER

## 2021-06-17 VITALS — HEIGHT: 66 IN | BODY MASS INDEX: 38.25 KG/M2 | WEIGHT: 238 LBS

## 2021-06-17 DIAGNOSIS — R10.9 BILATERAL FLANK PAIN: ICD-10-CM

## 2021-06-17 DIAGNOSIS — N20.1 RIGHT URETERAL CALCULUS: Primary | ICD-10-CM

## 2021-06-17 DIAGNOSIS — R10.9 RIGHT FLANK PAIN: ICD-10-CM

## 2021-06-17 DIAGNOSIS — R35.0 FREQUENCY OF URINATION: ICD-10-CM

## 2021-06-17 DIAGNOSIS — N20.1 RIGHT URETERAL CALCULUS: ICD-10-CM

## 2021-06-17 PROCEDURE — 74018 RADEX ABDOMEN 1 VIEW: CPT

## 2021-06-17 PROCEDURE — 99214 OFFICE O/P EST MOD 30 MIN: CPT | Performed by: NURSE PRACTITIONER

## 2021-06-17 PROCEDURE — 74018 RADEX ABDOMEN 1 VIEW: CPT | Performed by: RADIOLOGY

## 2021-06-17 PROCEDURE — 81003 URINALYSIS AUTO W/O SCOPE: CPT | Performed by: NURSE PRACTITIONER

## 2021-06-17 RX ORDER — POLYETHYLENE GLYCOL 3350 17 G/17G
POWDER, FOR SOLUTION ORAL DAILY
COMMUNITY
Start: 2021-05-12

## 2021-06-17 RX ORDER — HYDROXYCHLOROQUINE SULFATE 200 MG/1
TABLET, FILM COATED ORAL 2 TIMES DAILY
COMMUNITY

## 2021-06-17 RX ORDER — HYALURONATE SODIUM, STABILIZED 60 MG/3 ML
SYRINGE (ML) INTRAARTICULAR
COMMUNITY
Start: 2021-05-13 | End: 2022-05-10

## 2021-06-17 NOTE — PROGRESS NOTES
"Chief Complaint  Right ureteral calculus/right flank pain (4 week follow up)    Subjective          Ami Macias presents to [facility]  History of Present Illness  Ms. AMI MACIAS is a very pleasant 60-year-old female returns to clinic today for 4week follow-up on a 3 mm right proximal ureteral calculus.  Patient is in no apparent discomfort today and reports doing relatively well.       She has had minimal flank pain, since evaluated in clinic 4 week ago, averaging discomfort of 3 on 10 but denies any pain or discomfort today.  However she is still not sure  whether she passed a kidney stone yet. She Denies any urinary symptoms of frequency urgency or dysuria, denies pelvic pressure or suprapubic discomfort.  She has intermittent nausea, denies vomiting, denies chills or fevers.  Her urine dipstick today is negative for any infection, she has 2+ microscopic hematuria, denies any episodes of gross hematuria     Her last CT scan done post ER visit 05/04/2021  showed a Large left renal cyst. Proximal right ureteral stone measuring 3 mm. THERE was Mild dilatation of the right renal pelvis and proximal right ureter.     We reviewed/discussed her KUB results today, Which is unremarkable. The calcification seen on the right side on the previous KUB at the level of L3-4 is not demonstratable on the current exam. No calcifications are identified overlying the kidneys or ureters on the current study.    Posterior stone.    Objective   Vital Signs:   Ht 167.6 cm (66\")   Wt 108 kg (238 lb)   BMI 38.41 kg/m²     Physical Exam   Result Review :                 Assessment and Plan    Diagnoses and all orders for this visit:    1. Right ureteral calculus (Primary)    2. Frequency of urination  -     POC Urinalysis Dipstick, Automated    3. Right flank pain    Discussed a kidney stone prevention diet to include increasing p.o. fluid intake, to at least 1 to 2 L of water daily.  " She is to avoid caffeine products such as cola, coffee, and to avoid soft or soda drinks.  She is to decrease her sodium consumption as in  Fast foods, eldridge, salted nuts, canned foods, and smoked/cured foods. She is also to decrease her oxalate consumption, as in spinach, Santa greens, and Rhubarb.  Also important is to decrease protein intake, as in red meats, peanut butter, and also avoid nuts.    Follow-up in clinic in 6 months for repeat KUB,    Patient is agreeable plan of care.      Follow Up   Return in about 6 months (around 12/17/2021) for Next scheduled follow up with KUB prior.  Patient was given instructions and counseling regarding her condition or for health maintenance advice. Please see specific information pulled into the AVS if appropriate.

## 2021-06-18 NOTE — PATIENT INSTRUCTIONS
"Textbook of Natural Medicine (5th ed., pp. 4355-3721.e3). [place], [place]: Elsevier.\">   Dietary Guidelines to Help Prevent Kidney Stones  Kidney stones are deposits of minerals and salts that form inside your kidneys. Your risk of developing kidney stones may be greater depending on your diet, your lifestyle, the medicines you take, and whether you have certain medical conditions. Most people can lower their chances of developing kidney stones by following the instructions below. Your dietitian may give you more specific instructions depending on your overall health and the type of kidney stones you tend to develop.  What are tips for following this plan?  Reading food labels    · Choose foods with \"no salt added\" or \"low-salt\" labels. Limit your salt (sodium) intake to less than 1,500 mg a day.  · Choose foods with calcium for each meal and snack. Try to eat about 300 mg of calcium at each meal. Foods that contain 200-500 mg of calcium a serving include:  ? 8 oz (237 mL) of milk, calcium-fortifiednon-dairy milk, and calcium-fortifiedfruit juice. Calcium-fortified means that calcium has been added to these drinks.  ? 8 oz (237 mL) of kefir, yogurt, and soy yogurt.  ? 4 oz (114 g) of tofu.  ? 1 oz (28 g) of cheese.  ? 1 cup (150 g) of dried figs.  ? 1 cup (91 g) of cooked broccoli.  ? One 3 oz (85 g) can of sardines or mackerel.  Most people need 1,000-1,500 mg of calcium a day. Talk to your dietitian about how much calcium is recommended for you.  Shopping  · Buy plenty of fresh fruits and vegetables. Most people do not need to avoid fruits and vegetables, even if these foods contain nutrients that may contribute to kidney stones.  · When shopping for convenience foods, choose:  ? Whole pieces of fruit.  ? Pre-made salads with dressing on the side.  ? Low-fat fruit and yogurt smoothies.  · Avoid buying frozen meals or prepared deli foods. These can be high in sodium.  · Look for foods with live cultures, such as " yogurt and kefir.  · Choose high-fiber grains, such as whole-wheat breads, oat bran, and wheat cereals.  Cooking  · Do not add salt to food when cooking. Place a salt shaker on the table and allow each person to add his or her own salt to taste.  · Use vegetable protein, such as beans, textured vegetable protein (TVP), or tofu, instead of meat in pasta, casseroles, and soups.  Meal planning  · Eat less salt, if told by your dietitian. To do this:  ? Avoid eating processed or pre-made food.  ? Avoid eating fast food.  · Eat less animal protein, including cheese, meat, poultry, or fish, if told by your dietitian. To do this:  ? Limit the number of times you have meat, poultry, fish, or cheese each week. Eat a diet free of meat at least 2 days a week.  ? Eat only one serving each day of meat, poultry, fish, or seafood.  ? When you prepare animal protein, cut pieces into small portion sizes. For most meat and fish, one serving is about the size of the palm of your hand.  · Eat at least five servings of fresh fruits and vegetables each day. To do this:  ? Keep fruits and vegetables on hand for snacks.  ? Eat one piece of fruit or a handful of berries with breakfast.  ? Have a salad and fruit at lunch.  ? Have two kinds of vegetables at dinner.  · Limit foods that are high in a substance called oxalate. These include:  ? Spinach (cooked), rhubarb, beets, sweet potatoes, and Swiss chard.  ? Peanuts.  ? Potato chips, french fries, and baked potatoes with skin on.  ? Nuts and nut products.  ? Chocolate.  · If you regularly take a diuretic medicine, make sure to eat at least 1 or 2 servings of fruits or vegetables that are high in potassium each day. These include:  ? Avocado.  ? Banana.  ? Orange, prune, carrot, or tomato juice.  ? Baked potato.  ? Cabbage.  ? Beans and split peas.  Lifestyle    · Drink enough fluid to keep your urine pale yellow. This is the most important thing you can do. Spread your fluid intake  throughout the day.  · If you drink alcohol:  ? Limit how much you use to:  § 0-1 drink a day for women who are not pregnant.  § 0-2 drinks a day for men.  ? Be aware of how much alcohol is in your drink. In the [place] one drink equals one 12 oz bottle of beer (355 mL), one 5 oz glass of wine (148 mL), or one 1½ oz glass of hard liquor (44 mL).  · Lose weight if told by your health care provider. Work with your dietitian to find an eating plan and weight loss strategies that work best for you.  General information  · Talk to your health care provider and dietitian about taking daily supplements. You may be told the following depending on your health and the cause of your kidney stones:  ? Not to take supplements with vitamin C.  ? To take a calcium supplement.  ? To take a daily probiotic supplement.  ? To take other supplements such as magnesium, fish oil, or vitamin B6.  · Take over-the-counter and prescription medicines only as told by your health care provider. These include supplements.  What foods should I limit?  Limit your intake of the following foods, or eat them as told by your dietitian.  Vegetables  Spinach. Rhubarb. Beets. Canned vegetables. Pickles. Olives. Baked potatoes with skin.  Grains  Wheat bran. Baked goods. Salted crackers. Cereals high in sugar.  Meats and other proteins  Nuts. Nut butters. Large portions of meat, poultry, or fish. Salted, precooked, or cured meats, such as sausages, meat loaves, and hot dogs.  Dairy  Cheese.  Beverages  Regular soft drinks. Regular vegetable juice.  Seasonings and condiments  Seasoning blends with salt. Salad dressings. Soy sauce. Ketchup. Barbecue sauce.  Other foods  Canned soups. Canned pasta sauce. Casseroles. Pizza. Lasagna. Frozen meals. Potato chips. French fries.  The items listed above may not be a complete list of foods and beverages you should limit. Contact a dietitian for more information.  What foods should I avoid?  Talk to your dietitian  about specific foods you should avoid based on the type of kidney stones you have and your overall health.  Fruits  Grapefruit.  The item listed above may not be a complete list of foods and beverages you should avoid. Contact a dietitian for more information.  Summary  · Kidney stones are deposits of minerals and salts that form inside your kidneys.  · You can lower your risk of kidney stones by making changes to your diet.  · The most important thing you can do is drink enough fluid. Drink enough fluid to keep your urine pale yellow.  · Talk to your dietitian about how much calcium you should have each day, and eat less salt and animal protein as told by your dietitian.  This information is not intended to replace advice given to you by your health care provider. Make sure you discuss any questions you have with your health care provider.  Document Revised: 12/10/2020 Document Reviewed: 12/10/2020  [employer] Patient Education © 2021 [employer]      Low-Purine Eating Plan  A low-purine eating plan involves making food choices to limit your intake of purine. Purine is a kind of uric acid. Too much uric acid in your blood can cause certain conditions, such as gout and kidney stones. Eating a low-purine diet can help control these conditions.  What are tips for following this plan?  Reading food labels    · Avoid foods with saturated or Trans fat.  · Check the ingredient list of grains-based foods, such as bread and cereal, to make sure that they contain whole grains.  · Check the ingredient list of sauces or soups to make sure they do not contain meat or fish.  · When choosing soft drinks, check the ingredient list to make sure they do not contain high-fructose corn syrup.  Shopping  · Buy plenty of fresh fruits and vegetables.  · Avoid buying canned or fresh fish.  · Buy dairy products labeled as low-fat or nonfat.  · Avoid buying premade or processed foods. These foods are often high in fat, salt (sodium), and  added sugar.  Cooking  · Use olive oil instead of butter when cooking. Oils like olive oil, canola oil, and sunflower oil contain healthy fats.  Meal planning  · Learn which foods do or do not affect you. If you find out that a food tends to cause your gout symptoms to flare up, avoid eating that food. You can enjoy foods that do not cause problems. If you have any questions about a food item, talk with your dietitian or health care provider.  · Limit foods high in fat, especially saturated fat. Fat makes it harder for your body to get rid of uric acid.  · Choose foods that are lower in fat and are lean sources of protein.  General guidelines  · Limit alcohol intake to no more than 1 drink a day for nonpregnant women and 2 drinks a day for men. One drink equals 12 oz of beer, 5 oz of wine, or 1½ oz of hard liquor. Alcohol can affect the way your body gets rid of uric acid.  · Drink plenty of water to keep your urine clear or pale yellow. Fluids can help remove uric acid from your body.  · If directed by your health care provider, take a vitamin C supplement.  · Work with your health care provider and dietitian to develop a plan to achieve or maintain a healthy weight. Losing weight can help reduce uric acid in your blood.  What foods are recommended?  The items listed may not be a complete list. Talk with your dietitian about what dietary choices are best for you.  Foods low in purines  Foods low in purines do not need to be limited. These include:  · All fruits.  · All low-purine vegetables, pickles, and olives.  · Breads, pasta, rice, cornbread, and popcorn. Cake and other baked goods.  · All dairy foods.  · Eggs, nuts, and nut butters.  · Spices and condiments, such as salt, herbs, and vinegar.  · Plant oils, butter, and margarine.  · Water, sugar-free soft drinks, tea, coffee, and cocoa.  · Vegetable-based soups, broths, sauces, and gravies.  Foods moderate in purines  Foods moderate in purines should be limited  to the amounts listed.  · ½ cup of asparagus, cauliflower, spinach, mushrooms, or green peas, each day.  · 2/3 cup uncooked oatmeal, each day.  · ¼ cup dry wheat bran or wheat germ, each day.  · 2-3 ounces of meat or poultry, each day.  · 4-6 ounces of shellfish, such as crab, lobster, oysters, or shrimp, each day.  · 1 cup cooked beans, peas, or lentils, each day.  · Soup, broths, or bouillon made from meat or fish. Limit these foods as much as possible.  What foods are not recommended?  The items listed may not be a complete list. Talk with your dietitian about what dietary choices are best for you.  Limit your intake of foods high in purines, including:  · Beer and other alcohol.  · Meat-based gravy or sauce.  · Canned or fresh fish, such as:  ? Anchovies, sardines, herring, and tuna.  ? Mussels and scallops.  ? Codfish, trout, and aidee.  · Gross.  · Organ meats, such as:  ? Liver or kidney.  ? Tripe.  ? Sweetbreads (thymus gland or pancreas).  · Wild game or goose.  · Yeast or yeast extract supplements.  · Drinks sweetened with high-fructose corn syrup.  Summary  · Eating a low-purine diet can help control conditions caused by too much uric acid in the body, such as gout or kidney stones.  · Choose low-purine foods, limit alcohol, and limit foods high in fat.  · You will learn over time which foods do or do not affect you. If you find out that a food tends to cause your gout symptoms to flare up, avoid eating that food.  This information is not intended to replace advice given to you by your health care provider. Make sure you discuss any questions you have with your health care provider.  Document Revised: 11/30/2018 Document Reviewed: 01/31/2018  [employer] Patient Education © 2021 [employer]

## 2022-02-17 ENCOUNTER — OFFICE VISIT (OUTPATIENT)
Dept: CARDIOLOGY | Facility: CLINIC | Age: 62
End: 2022-02-17

## 2022-02-17 VITALS
HEART RATE: 73 BPM | BODY MASS INDEX: 38.15 KG/M2 | RESPIRATION RATE: 16 BRPM | HEIGHT: 66 IN | DIASTOLIC BLOOD PRESSURE: 98 MMHG | SYSTOLIC BLOOD PRESSURE: 157 MMHG | WEIGHT: 237.4 LBS | TEMPERATURE: 97.7 F

## 2022-02-17 DIAGNOSIS — R07.2 PRECORDIAL PAIN: ICD-10-CM

## 2022-02-17 DIAGNOSIS — R00.2 PALPITATIONS: Primary | ICD-10-CM

## 2022-02-17 DIAGNOSIS — R06.09 DYSPNEA ON EXERTION: ICD-10-CM

## 2022-02-17 DIAGNOSIS — R55 NEAR SYNCOPE: ICD-10-CM

## 2022-02-17 PROCEDURE — 99204 OFFICE O/P NEW MOD 45 MIN: CPT | Performed by: INTERNAL MEDICINE

## 2022-02-17 PROCEDURE — 93000 ELECTROCARDIOGRAM COMPLETE: CPT | Performed by: INTERNAL MEDICINE

## 2022-02-17 RX ORDER — CARVEDILOL 6.25 MG/1
6.25 TABLET ORAL 2 TIMES DAILY
Qty: 60 TABLET | Refills: 11 | Status: SHIPPED | OUTPATIENT
Start: 2022-02-17 | End: 2022-03-21

## 2022-02-17 RX ORDER — COLCHICINE 0.6 MG/1
0.6 TABLET ORAL DAILY
COMMUNITY

## 2022-02-17 NOTE — PROGRESS NOTES
Corey Melton MD  Wendi Washington  1960 02/17/2022        Dear Corey Melton MD:    Subjective     Wendi Washington is a 61 y.o. female with the problems as listed above, presents     Chief complaint: Recurrent palpitations and dizziness, near syncope and shortness of breath.    History of Present Illness:  is a pleasant 61-year-old  female with no history of known significant coronary artery disease but apparently has history of syncope in the past for which she was evaluated by Dr. Byron Flores in [place] about 15-20 years ago with cardiac catheterization which reportedly was unremarkable.  She reportedly had a tilt table test at that time that she failed.  She was treated with some medications and was asked to drink a Gatorade with which she apparently did okay for the last several years.  Over the last 3 to 4 years, she has noticed some recurrent palpitations with feeling of fluttering and skipping in the chest associated with dizziness and near syncope.  She also has noticed some shortness of breath going up a flight of stairs.  She denies any PND, orthopnea pedal edema.  She has some intermittent left-sided chest pain that she thinks is a muscle ache.  She is a non-smoker.  She claims to be a prediabetic and never has to take any medications for it.  She is nonhypertensive.    Cardiac catheterization on 1/24/2014 revealed:        No Known Allergies:      Current Outpatient Medications:   •  Cholecalciferol (VITAMIN D PO), Take  by mouth Daily., Disp: , Rfl:   •  colchicine 0.6 MG tablet, Take 0.6 mg by mouth 2 (Two) Times a Day., Disp: , Rfl:   •  Cyanocobalamin (VITAMIN B-12 PO), Take  by mouth Daily., Disp: , Rfl:   •  HYDROcodone-acetaminophen (NORCO) 5-325 MG per tablet, Take 1 tablet by mouth Every 6 (Six) Hours As Needed for Mild Pain ., Disp: 12 tablet, Rfl: 0  •  hydroxychloroquine (PLAQUENIL) 200 MG tablet, Take  by mouth Daily., Disp: , Rfl:   •  levothyroxine (SYNTHROID,  LEVOTHROID) 137 MCG tablet, Take 137 mcg by mouth Daily., Disp: , Rfl: 5  •  ondansetron ODT (ZOFRAN-ODT) 4 MG disintegrating tablet, Place 1 tablet on the tongue Every 6 (Six) Hours As Needed for Nausea or Vomiting., Disp: 12 tablet, Rfl: 0  •  polyethylene glycol (MIRALAX) 17 GM/SCOOP powder, Daily., Disp: , Rfl:   •  XARELTO 20 MG tablet, Take 20 mg by mouth Daily With Dinner., Disp: , Rfl: 2  •  carvedilol (COREG) 6.25 MG tablet, Take 1 tablet by mouth 2 (Two) Times a Day., Disp: 60 tablet, Rfl: 11  •  Durolane 60 MG/3ML prefilled syringe injection, , Disp: , Rfl:   •  tamsulosin (FLOMAX) 0.4 MG capsule 24 hr capsule, Take 1 capsule by mouth Daily., Disp: 30 capsule, Rfl: 0    Past Medical History:   Diagnosis Date   • Arthritis    • Asthma    • Clotting disorder (HCC)    • Diabetes mellitus (HCC)    • Disease of thyroid gland    • Kidney stone      Past Surgical History:   Procedure Laterality Date   • BACK SURGERY     •  SECTION     • CHOLECYSTECTOMY     • HIP SURGERY     • KNEE SURGERY      right in  and left in    • LUNG SURGERY     • LUNG SURGERY      right lung   • SHOULDER SURGERY         • THYROIDECTOMY       Family History   Problem Relation Age of Onset   • Hypertension Father    • Arthritis Father    • Asthma Father    • Cancer Father      Social History     Tobacco Use   • Smoking status: Never Smoker   • Smokeless tobacco: Never Used   Substance Use Topics   • Alcohol use: No   • Drug use: No       Review of Systems   Constitutional: Positive for malaise/fatigue.   HENT: Positive for congestion.    Cardiovascular: Positive for chest pain and palpitations.   Respiratory: Positive for wheezing.    Endocrine: Positive for cold intolerance and heat intolerance.   Hematologic/Lymphatic: Bruises/bleeds easily.   Skin: Positive for dry skin and poor wound healing.   Musculoskeletal: Positive for back pain, joint pain, muscle cramps and stiffness.   Genitourinary: Positive for  "frequency.        Hx renal stones   Neurological: Positive for light-headedness.       Objective   Blood pressure 157/98, pulse 73, temperature 97.7 °F (36.5 °C), resp. rate 16, height 167.6 cm (66\"), weight 108 kg (237 lb 6.4 oz).  Body mass index is 38.32 kg/m².    Vitals reviewed.   Constitutional:       Appearance: Well-developed.   Eyes:      Conjunctiva/sclera: Conjunctivae normal.   HENT:      Head: Normocephalic.   Neck:      Thyroid: No thyromegaly.      Vascular: No JVD.      Trachea: No tracheal deviation.   Pulmonary:      Effort: No respiratory distress.      Breath sounds: Normal breath sounds. No wheezing. No rales.   Cardiovascular:      PMI at left midclavicular line. Normal rate. Regular rhythm. Normal S1. Normal S2.      Murmurs: There is no murmur.      No gallop. No click. No rub.   Pulses:     Intact distal pulses.   Edema:     Peripheral edema absent.   Abdominal:      General: Bowel sounds are normal.      Palpations: Abdomen is soft. There is no abdominal mass.      Tenderness: There is no abdominal tenderness.   Musculoskeletal:      Cervical back: Normal range of motion and neck supple. Skin:     General: Skin is warm and dry.   Neurological:      Mental Status: Alert and oriented to person, place, and time.      Cranial Nerves: No cranial nerve deficit.         Lab Results   Component Value Date     05/04/2021    K 4.2 05/04/2021     05/04/2021    CO2 28.6 05/04/2021    BUN 10 05/04/2021    CREATININE 0.75 05/04/2021    GLUCOSE 102 (H) 05/04/2021    CALCIUM 9.5 05/04/2021    AST 20 05/04/2021    ALT 10 05/04/2021    ALKPHOS 102 05/04/2021    LABIL2 1.4 (L) 05/26/2016     No results found for: CKTOTAL  Lab Results   Component Value Date    WBC 9.98 05/04/2021    HGB 14.2 05/04/2021    HCT 43.6 05/04/2021     05/04/2021     Lab Results   Component Value Date    INR <0.90 06/03/2015     No results found for: MG  Lab Results   Component Value Date    TSH 0.811 04/21/2021    " CHLPL 219 (H) 05/26/2016    TRIG 99 04/21/2021    HDL 55 04/21/2021     (H) 04/21/2021        ECG 12 Lead    Date/Time: 2/17/2022 12:12 PM  Performed by: Perfecto Howard MD  Authorized by: Perfecto Howard MD   Comparison: compared with previous ECG from 1/24/2014  Comparison to previous ECG: EKG today reveals some downsloping of the ST segments with T wave inversion in leads V2 through V6 which not seen on the previous EKG.  Rhythm: sinus rhythm  BPM: 71  Conduction: conduction normal  Comments: ST-T wave changes suspicious for anterolateral myocardial ischemia.                Assessment/Plan :   Diagnosis Plan   1. Palpitations  Cardiac Event Monitor   2. Near syncope  Cardiac Event Monitor   3. Precordial pain  Stress Test With Myocardial Perfusion (1 Day)   4. Dyspnea on exertion  Adult Transthoracic Echo Complete w/ Color, Spectral and Contrast if necessary per protocol     Recommendations:  Orders Placed This Encounter   Procedures   • Cardiac Event Monitor   • Stress Test With Myocardial Perfusion (1 Day)   • ECG 12 Lead   • Adult Transthoracic Echo Complete w/ Color, Spectral and Contrast if necessary per protocol        1. Add enteric-coated aspirin 81 mg daily and carvedilol 6.25 mg p.o. twice daily.  2. Evaluate further with a Lexiscan sestamibi study due to arthritis in her knees.  3. Evaluate her dyspnea with an echo Doppler study.  4. Obtain an event monitor for her palpitations and dizziness and near syncope.  5. We will decrease the dose of colchicine to 0.6 mg once a day due to interaction with carvedilol.    Return in about 4 weeks (around 3/17/2022).    As always, Corey Melton MD  I appreciate very much the opportunity to participate in the cardiovascular care of your patients. Please do not hesitate to call me with any questions with regards to Wendi Washington's evaluation and management.       With Best Regards,        Perfecto Howard MD, [facility]    Please note that portions of this note  were completed with a voice recognition program.

## 2022-03-14 ENCOUNTER — HOSPITAL ENCOUNTER (OUTPATIENT)
Dept: NUCLEAR MEDICINE | Facility: HOSPITAL | Age: 62
Discharge: HOME | End: 2022-03-14

## 2022-03-14 ENCOUNTER — HOSPITAL ENCOUNTER (OUTPATIENT)
Dept: CARDIOLOGY | Facility: HOSPITAL | Age: 62
Discharge: HOME | End: 2022-03-14

## 2022-03-14 DIAGNOSIS — R07.2 PRECORDIAL PAIN: ICD-10-CM

## 2022-03-14 DIAGNOSIS — R06.09 DYSPNEA ON EXERTION: ICD-10-CM

## 2022-03-14 LAB
BH CV NUCLEAR PRIOR STUDY: 3
BH CV REST NUCLEAR ISOTOPE DOSE: 9.9 MCI
BH CV STRESS BP STAGE 1: NORMAL
BH CV STRESS BP STAGE 2: NORMAL
BH CV STRESS COMMENTS STAGE 1: NORMAL
BH CV STRESS COMMENTS STAGE 2: NORMAL
BH CV STRESS DOSE REGADENOSON STAGE 1: 0.4
BH CV STRESS DURATION MIN STAGE 1: 0
BH CV STRESS DURATION MIN STAGE 2: 4
BH CV STRESS DURATION SEC STAGE 1: 10
BH CV STRESS DURATION SEC STAGE 2: 0
BH CV STRESS HR STAGE 1: 98
BH CV STRESS HR STAGE 2: 86
BH CV STRESS NUCLEAR ISOTOPE DOSE: 30.2 MCI
BH CV STRESS PROTOCOL 1: NORMAL
BH CV STRESS RECOVERY BP: NORMAL MMHG
BH CV STRESS RECOVERY HR: 66 BPM
BH CV STRESS STAGE 1: 1
BH CV STRESS STAGE 2: 2
LV EF NUC BP: 69 %
MAXIMAL PREDICTED HEART RATE: 159 BPM
PERCENT MAX PREDICTED HR: 54.09 %
STRESS BASELINE BP: NORMAL MMHG
STRESS BASELINE HR: 65 BPM
STRESS PERCENT HR: 64 %
STRESS POST PEAK BP: NORMAL MMHG
STRESS POST PEAK HR: 86 BPM
STRESS TARGET HR: 135 BPM

## 2022-03-14 PROCEDURE — 78452 HT MUSCLE IMAGE SPECT MULT: CPT

## 2022-03-14 PROCEDURE — 0 TECHNETIUM SESTAMIBI: Performed by: INTERNAL MEDICINE

## 2022-03-14 PROCEDURE — 93306 TTE W/DOPPLER COMPLETE: CPT

## 2022-03-14 PROCEDURE — 93306 TTE W/DOPPLER COMPLETE: CPT | Performed by: INTERNAL MEDICINE

## 2022-03-14 PROCEDURE — A9500 TC99M SESTAMIBI: HCPCS | Performed by: INTERNAL MEDICINE

## 2022-03-14 PROCEDURE — 78452 HT MUSCLE IMAGE SPECT MULT: CPT | Performed by: INTERNAL MEDICINE

## 2022-03-14 PROCEDURE — 93018 CV STRESS TEST I&R ONLY: CPT | Performed by: INTERNAL MEDICINE

## 2022-03-14 PROCEDURE — 25010000002 REGADENOSON 0.4 MG/5ML SOLUTION: Performed by: INTERNAL MEDICINE

## 2022-03-14 PROCEDURE — 93017 CV STRESS TEST TRACING ONLY: CPT

## 2022-03-14 RX ADMIN — REGADENOSON 0.4 MG: 0.08 INJECTION, SOLUTION INTRAVENOUS at 09:16

## 2022-03-14 RX ADMIN — TECHNETIUM TC 99M SESTAMIBI 1 DOSE: 1 INJECTION INTRAVENOUS at 09:55

## 2022-03-14 RX ADMIN — TECHNETIUM TC 99M SESTAMIBI 1 DOSE: 1 INJECTION INTRAVENOUS at 07:55

## 2022-03-15 LAB
BH CV ECHO MEAS - AO ROOT AREA (BSA CORRECTED): 1.3
BH CV ECHO MEAS - AO ROOT AREA: 5.7 CM^2
BH CV ECHO MEAS - AO ROOT DIAM: 2.7 CM
BH CV ECHO MEAS - BSA(HAYCOCK): 2.3 M^2
BH CV ECHO MEAS - BSA: 2.1 M^2
BH CV ECHO MEAS - BZI_BMI: 38.3 KILOGRAMS/M^2
BH CV ECHO MEAS - BZI_METRIC_HEIGHT: 167.6 CM
BH CV ECHO MEAS - BZI_METRIC_WEIGHT: 107.5 KG
BH CV ECHO MEAS - EDV(MOD-SP4): 59 ML
BH CV ECHO MEAS - EF(MOD-SP4): 48.3 %
BH CV ECHO MEAS - ESV(MOD-SP4): 30.5 ML
BH CV ECHO MEAS - LA DIMENSION: 4 CM
BH CV ECHO MEAS - LA/AO: 1.5
BH CV ECHO MEAS - LV DIASTOLIC VOL/BSA (35-75): 27.4 ML/M^2
BH CV ECHO MEAS - LV SYSTOLIC VOL/BSA (12-30): 14.2 ML/M^2
BH CV ECHO MEAS - LVLD AP4: 6.6 CM
BH CV ECHO MEAS - LVLS AP4: 6.9 CM
BH CV ECHO MEAS - LVOT AREA (M): 3.5 CM^2
BH CV ECHO MEAS - LVOT AREA: 3.5 CM^2
BH CV ECHO MEAS - LVOT DIAM: 2.1 CM
BH CV ECHO MEAS - MV A MAX VEL: 75 CM/SEC
BH CV ECHO MEAS - MV E MAX VEL: 69.8 CM/SEC
BH CV ECHO MEAS - MV E/A: 0.93
BH CV ECHO MEAS - PA ACC TIME: 0.16 SEC
BH CV ECHO MEAS - PA PR(ACCEL): 6.1 MMHG
BH CV ECHO MEAS - SI(MOD-SP4): 13.3 ML/M^2
BH CV ECHO MEAS - SV(MOD-SP4): 28.5 ML
MAXIMAL PREDICTED HEART RATE: 159 BPM
STRESS TARGET HR: 135 BPM

## 2022-03-21 ENCOUNTER — OFFICE VISIT (OUTPATIENT)
Dept: CARDIOLOGY | Facility: CLINIC | Age: 62
End: 2022-03-21

## 2022-03-21 VITALS
HEIGHT: 66 IN | DIASTOLIC BLOOD PRESSURE: 82 MMHG | RESPIRATION RATE: 16 BRPM | BODY MASS INDEX: 38.15 KG/M2 | SYSTOLIC BLOOD PRESSURE: 126 MMHG | HEART RATE: 60 BPM | TEMPERATURE: 98.1 F | WEIGHT: 237.4 LBS

## 2022-03-21 DIAGNOSIS — I49.3 SYMPTOMATIC PVCS: Primary | ICD-10-CM

## 2022-03-21 PROCEDURE — 99214 OFFICE O/P EST MOD 30 MIN: CPT | Performed by: NURSE PRACTITIONER

## 2022-03-21 RX ORDER — ASPIRIN 81 MG/1
81 TABLET ORAL DAILY
COMMUNITY
End: 2022-03-21

## 2022-03-21 RX ORDER — METOPROLOL TARTRATE 50 MG/1
50 TABLET, FILM COATED ORAL 2 TIMES DAILY
Qty: 60 TABLET | Refills: 11 | Status: SHIPPED | OUTPATIENT
Start: 2022-03-21 | End: 2022-04-18

## 2022-03-21 RX ORDER — NICOTINE 14MG/24HR
PATCH, TRANSDERMAL 24 HOURS TRANSDERMAL DAILY
COMMUNITY

## 2022-03-21 NOTE — PROGRESS NOTES
Corey Melton MD  Wendi Washington  1960 03/21/2022    There is no problem list on file for this patient.      Dear Corey Mleton MD:    Subjective     Chief Complaint   Patient presents with   • Follow-up     Stress ,echo findingings, holter monitor not yet returned   • Palpitations     Some better   • Shortness of Breath     some   • Med Management     List provided           History of Present Illness:    Wendi Washington is a 61 y.o. female with a past medical history of hypertension with recent palpitations and chest pains.  She was evaluated further with a Lexiscan stress test which was negative for evidence of ischemia.  Echocardiogram revealed normal LV function 51 to 55% with no significant valvular abnormalities.  She did wear this a 30-day event monitor.  Preliminary report has been reviewed today.  This revealed predominantly sinus rhythm with frequent, symptomatic PVCs and some bigeminy.  She denies any chest pains or shortness of breath.  However, she has frequent palpitations which feels like her heart is skipping beats and causes diaphoresis and lightheadedness.  Since starting carvedilol at her last visit her symptoms have markedly improved.  However, she continues to have some palpitations.          No Known Allergies:      Current Outpatient Medications:   •  Cholecalciferol (VITAMIN D PO), Take  by mouth Daily., Disp: , Rfl:   •  colchicine 0.6 MG tablet, Take 0.6 mg by mouth 2 (Two) Times a Day., Disp: , Rfl:   •  Cyanocobalamin (VITAMIN B-12 PO), Take  by mouth Daily., Disp: , Rfl:   •  HYDROcodone-acetaminophen (NORCO) 5-325 MG per tablet, Take 1 tablet by mouth Every 6 (Six) Hours As Needed for Mild Pain ., Disp: 12 tablet, Rfl: 0  •  hydroxychloroquine (PLAQUENIL) 200 MG tablet, Take  by mouth Daily., Disp: , Rfl:   •  levothyroxine (SYNTHROID, LEVOTHROID) 137 MCG tablet, Take 137 mcg by mouth Daily., Disp: , Rfl: 5  •  ondansetron ODT (ZOFRAN-ODT) 4 MG disintegrating tablet, Place 1  "tablet on the tongue Every 6 (Six) Hours As Needed for Nausea or Vomiting., Disp: 12 tablet, Rfl: 0  •  polyethylene glycol (MIRALAX) 17 GM/SCOOP powder, Daily., Disp: , Rfl:   •  XARELTO 20 MG tablet, Take 20 mg by mouth Daily With Dinner., Disp: , Rfl: 2  •  Durolane 60 MG/3ML prefilled syringe injection, , Disp: , Rfl:   •  metoprolol tartrate (LOPRESSOR) 50 MG tablet, Take 1 tablet by mouth 2 (Two) Times a Day., Disp: 60 tablet, Rfl: 11  •  Saccharomyces boulardii (Probiotic) 250 MG capsule, Take  by mouth Daily., Disp: , Rfl:   •  tamsulosin (FLOMAX) 0.4 MG capsule 24 hr capsule, Take 1 capsule by mouth Daily., Disp: 30 capsule, Rfl: 0      The following portions of the patient's history were reviewed and updated as appropriate: allergies, current medications, past family history, past medical history, past social history, past surgical history and problem list.    Social History     Tobacco Use   • Smoking status: Never Smoker   • Smokeless tobacco: Never Used   Substance Use Topics   • Alcohol use: No   • Drug use: No       ROS    Objective   Vitals:    03/21/22 1027   BP: 126/82   Pulse: 60   Resp: 16   Temp: 98.1 °F (36.7 °C)   Weight: 108 kg (237 lb 6.4 oz)   Height: 167.6 cm (66\")     Body mass index is 38.32 kg/m².        Vitals reviewed.   Constitutional:       Appearance: Healthy appearance. Well-developed and not in distress.   HENT:      Head: Normocephalic and atraumatic.   Neck:      Vascular: No JVD.   Pulmonary:      Effort: Pulmonary effort is normal.      Breath sounds: Normal breath sounds. No wheezing. No rales.   Cardiovascular:      Normal rate. Regular rhythm.      Murmurs: There is no murmur.      . No S3 and S4 gallop.   Edema:     Peripheral edema absent.   Abdominal:      General: Bowel sounds are normal.      Palpations: Abdomen is soft.   Skin:     General: Skin is warm and dry.   Neurological:      Mental Status: Alert, oriented to person, place, and time and oriented to person, " place and time.   Psychiatric:         Mood and Affect: Mood normal.         Behavior: Behavior normal.         Lab Results   Component Value Date     05/04/2021    K 4.2 05/04/2021     05/04/2021    CO2 28.6 05/04/2021    BUN 10 05/04/2021    CREATININE 0.75 05/04/2021    GLUCOSE 102 (H) 05/04/2021    CALCIUM 9.5 05/04/2021    AST 20 05/04/2021    ALT 10 05/04/2021    ALKPHOS 102 05/04/2021    LABIL2 1.4 (L) 05/26/2016     No results found for: CKTOTAL  Lab Results   Component Value Date    WBC 9.98 05/04/2021    HGB 14.2 05/04/2021    HCT 43.6 05/04/2021     05/04/2021     Lab Results   Component Value Date    INR <0.90 06/03/2015     No results found for: MG  Lab Results   Component Value Date    TSH 0.811 04/21/2021    CHLPL 219 (H) 05/26/2016    TRIG 99 04/21/2021    HDL 55 04/21/2021     (H) 04/21/2021      No results found for: BNP        Procedures      Assessment/Plan    Diagnosis Plan   1. Symptomatic PVCs  metoprolol tartrate (LOPRESSOR) 50 MG tablet    Basic Metabolic Panel    Magnesium                Recommendations:    1. Since her stress test was negative for ischemia and her chest pains have resolved, will discontinue aspirin.  We will change carvedilol to metoprolol tartrate 50 mg twice daily.  2. BMP and magnesium level ordered today.  3. If her symptoms do not improve with metoprolol, will consider specific antiarrhythmic therapy such as flecainide.  4. Follow-up in 4 weeks or sooner if needed.        Return in about 4 weeks (around 4/18/2022) for Recheck.    As always, I appreciate very much the opportunity to participate in the cardiovascular care of your patients.      With Best Regards,    LEBRON Caceres

## 2022-03-23 ENCOUNTER — TREATMENT (OUTPATIENT)
Dept: CARDIOLOGY | Facility: CLINIC | Age: 62
End: 2022-03-23

## 2022-03-23 DIAGNOSIS — R55 NEAR SYNCOPE: ICD-10-CM

## 2022-03-23 DIAGNOSIS — R00.2 PALPITATIONS: ICD-10-CM

## 2022-03-23 PROCEDURE — 93228 REMOTE 30 DAY ECG REV/REPORT: CPT | Performed by: INTERNAL MEDICINE

## 2022-04-18 ENCOUNTER — OFFICE VISIT (OUTPATIENT)
Dept: CARDIOLOGY | Facility: CLINIC | Age: 62
End: 2022-04-18

## 2022-04-18 VITALS
BODY MASS INDEX: 38.63 KG/M2 | HEART RATE: 46 BPM | DIASTOLIC BLOOD PRESSURE: 78 MMHG | OXYGEN SATURATION: 98 % | TEMPERATURE: 97.3 F | WEIGHT: 240.4 LBS | HEIGHT: 66 IN | RESPIRATION RATE: 16 BRPM | SYSTOLIC BLOOD PRESSURE: 122 MMHG

## 2022-04-18 DIAGNOSIS — R00.1 BRADYCARDIA, SINUS: ICD-10-CM

## 2022-04-18 DIAGNOSIS — I49.3 SYMPTOMATIC PVCS: Primary | ICD-10-CM

## 2022-04-18 PROCEDURE — 99214 OFFICE O/P EST MOD 30 MIN: CPT | Performed by: NURSE PRACTITIONER

## 2022-04-18 RX ORDER — MONTELUKAST SODIUM 10 MG/1
10 TABLET ORAL NIGHTLY
COMMUNITY

## 2022-04-18 RX ORDER — AZELASTINE 1 MG/ML
2 SPRAY, METERED NASAL 2 TIMES DAILY
COMMUNITY

## 2022-04-18 RX ORDER — FLECAINIDE ACETATE 50 MG/1
50 TABLET ORAL 2 TIMES DAILY
Qty: 60 TABLET | Refills: 11 | Status: SHIPPED | OUTPATIENT
Start: 2022-04-18

## 2022-04-18 RX ORDER — METOPROLOL SUCCINATE 25 MG/1
25 TABLET, EXTENDED RELEASE ORAL DAILY
Qty: 30 TABLET | Refills: 11 | Status: SHIPPED | OUTPATIENT
Start: 2022-04-18

## 2022-04-18 NOTE — PROGRESS NOTES
Corey Melton MD  Wendi Washington  1960 04/18/2022    There is no problem list on file for this patient.      Dear Corey Melton MD:    Subjective     Chief Complaint   Patient presents with   • Palpitations     occas   • Med Management     verbal   • Follow-up     Labs not done           History of Present Illness:    Wendi Washington is a 61 y.o. female with a past medical history of hypertension and symptomatic PVCs.  She presents today for cardiology follow-up.  She does report her palpitations have become less frequent since starting metoprolol.  However, she has had frequent bradycardia with associated dizziness.  She does continue to have intermittent PVCs which are very symptomatic.  She has associated diaphoresis and extreme fatigue.  She recently had a 30-day event monitor which revealed predominantly sinus rhythm.  PVC burden was 3%.  The patient was symptomatic with PVCs during the monitoring period.  Echocardiogram revealed normal LV function with no valvular abnormalities and Lexiscan stress test was negative for evidence of ischemia.          No Known Allergies:      Current Outpatient Medications:   •  azelastine (ASTELIN) 0.1 % nasal spray, 2 sprays into the nostril(s) as directed by provider 2 (Two) Times a Day. Use in each nostril as directed, Disp: , Rfl:   •  Cholecalciferol (VITAMIN D PO), Take  by mouth Daily., Disp: , Rfl:   •  colchicine 0.6 MG tablet, Take 0.6 mg by mouth 2 (Two) Times a Day., Disp: , Rfl:   •  Cyanocobalamin (VITAMIN B-12 PO), Take  by mouth Daily., Disp: , Rfl:   •  HYDROcodone-acetaminophen (NORCO) 5-325 MG per tablet, Take 1 tablet by mouth Every 6 (Six) Hours As Needed for Mild Pain ., Disp: 12 tablet, Rfl: 0  •  hydroxychloroquine (PLAQUENIL) 200 MG tablet, Take  by mouth Daily., Disp: , Rfl:   •  levothyroxine (SYNTHROID, LEVOTHROID) 137 MCG tablet, Take 137 mcg by mouth Daily., Disp: , Rfl: 5  •  ondansetron ODT (ZOFRAN-ODT) 4 MG disintegrating tablet, Place  "1 tablet on the tongue Every 6 (Six) Hours As Needed for Nausea or Vomiting., Disp: 12 tablet, Rfl: 0  •  polyethylene glycol (MIRALAX) 17 GM/SCOOP powder, Daily., Disp: , Rfl:   •  Saccharomyces boulardii (Probiotic) 250 MG capsule, Take  by mouth Daily., Disp: , Rfl:   •  XARELTO 20 MG tablet, Take 20 mg by mouth Daily With Dinner., Disp: , Rfl: 2  •  Durolane 60 MG/3ML prefilled syringe injection, , Disp: , Rfl:   •  flecainide (TAMBOCOR) 50 MG tablet, Take 1 tablet by mouth 2 (Two) Times a Day., Disp: 60 tablet, Rfl: 11  •  metoprolol succinate XL (TOPROL-XL) 25 MG 24 hr tablet, Take 1 tablet by mouth Daily., Disp: 30 tablet, Rfl: 11  •  montelukast (SINGULAIR) 10 MG tablet, Take 10 mg by mouth Every Night., Disp: , Rfl:   •  tamsulosin (FLOMAX) 0.4 MG capsule 24 hr capsule, Take 1 capsule by mouth Daily., Disp: 30 capsule, Rfl: 0      The following portions of the patient's history were reviewed and updated as appropriate: allergies, current medications, past family history, past medical history, past social history, past surgical history and problem list.    Social History     Tobacco Use   • Smoking status: Never Smoker   • Smokeless tobacco: Never Used   Substance Use Topics   • Alcohol use: No   • Drug use: No       ROS    Objective   Vitals:    04/18/22 1049   BP: 122/78   Pulse: (!) 46   Resp: 16   Temp: 97.3 °F (36.3 °C)   SpO2: 98%   Weight: 109 kg (240 lb 6.4 oz)   Height: 167.6 cm (66\")     Body mass index is 38.8 kg/m².        Vitals reviewed.   Constitutional:       Appearance: Healthy appearance. Well-developed and not in distress.   HENT:      Head: Normocephalic and atraumatic.   Neck:      Vascular: No JVD.   Pulmonary:      Effort: Pulmonary effort is normal.      Breath sounds: Normal breath sounds. No wheezing. No rales.   Cardiovascular:      Bradycardia present. Regular rhythm.      Murmurs: There is no murmur.      . No S3 and S4 gallop.   Edema:     Peripheral edema absent.   Abdominal: "      General: Bowel sounds are normal.      Palpations: Abdomen is soft.   Skin:     General: Skin is warm and dry.   Neurological:      Mental Status: Alert, oriented to person, place, and time and oriented to person, place and time.   Psychiatric:         Mood and Affect: Mood normal.         Behavior: Behavior normal.         Lab Results   Component Value Date     05/04/2021    K 4.2 05/04/2021     05/04/2021    CO2 28.6 05/04/2021    BUN 10 05/04/2021    CREATININE 0.75 05/04/2021    GLUCOSE 102 (H) 05/04/2021    CALCIUM 9.5 05/04/2021    AST 20 05/04/2021    ALT 10 05/04/2021    ALKPHOS 102 05/04/2021    LABIL2 1.4 (L) 05/26/2016     No results found for: CKTOTAL  Lab Results   Component Value Date    WBC 9.98 05/04/2021    HGB 14.2 05/04/2021    HCT 43.6 05/04/2021     05/04/2021     Lab Results   Component Value Date    INR <0.90 06/03/2015     No results found for: MG  Lab Results   Component Value Date    TSH 0.811 04/21/2021    CHLPL 219 (H) 05/26/2016    TRIG 99 04/21/2021    HDL 55 04/21/2021     (H) 04/21/2021      No results found for: BNP        Procedures      Assessment/Plan    Diagnosis Plan   1. Symptomatic PVCs  metoprolol succinate XL (TOPROL-XL) 25 MG 24 hr tablet    flecainide (TAMBOCOR) 50 MG tablet    ECG 12 Lead   2. Bradycardia, sinus                  Recommendations:    1. Symptomatic PVCs- since she is symptomatic with beta-blocker therapy has no evidence of coronary artery disease or structural heart disease, will initiate flecainide 50 mg twice daily.  I asked her to return to our office in 48 hours for an EKG.  She was agreeable.  2. Sinus bradycardia- we will discontinue metoprolol tartrate and initiate metoprolol succinate 25 mg and continue to monitor.  3. Follow-up in 4 weeks or sooner if needed.    Plan of care discussed with Dr. Howard    Return in about 4 weeks (around 5/16/2022) for Recheck.    As always, I appreciate very much the opportunity to  participate in the cardiovascular care of your patients.      With Best Regards,    LEBRON Caceres

## 2022-04-20 ENCOUNTER — CLINICAL SUPPORT (OUTPATIENT)
Dept: CARDIOLOGY | Facility: CLINIC | Age: 62
End: 2022-04-20

## 2022-04-20 ENCOUNTER — TELEPHONE (OUTPATIENT)
Dept: CARDIOLOGY | Facility: CLINIC | Age: 62
End: 2022-04-20

## 2022-04-20 DIAGNOSIS — I49.3 SYMPTOMATIC PVCS: ICD-10-CM

## 2022-04-20 NOTE — PROGRESS NOTES
Pt came in for EKG only per Patricia Simmons request due to starting flecainide. EKG performed and Becki reveiwed and stated that EKG was good and pt left. Will let RS know pt came in.

## 2022-04-20 NOTE — TELEPHONE ENCOUNTER
Pt came in for EKG only. Luan looked at it and it was good. Placed EKG on Patricia's desk for her to see as well.

## 2022-05-10 ENCOUNTER — OFFICE VISIT (OUTPATIENT)
Dept: CARDIOLOGY | Facility: CLINIC | Age: 62
End: 2022-05-10

## 2022-05-10 VITALS
WEIGHT: 231 LBS | OXYGEN SATURATION: 97 % | HEIGHT: 66 IN | BODY MASS INDEX: 37.12 KG/M2 | DIASTOLIC BLOOD PRESSURE: 88 MMHG | SYSTOLIC BLOOD PRESSURE: 124 MMHG | HEART RATE: 65 BPM

## 2022-05-10 DIAGNOSIS — I49.3 PVC (PREMATURE VENTRICULAR CONTRACTION): ICD-10-CM

## 2022-05-10 DIAGNOSIS — I49.3 SYMPTOMATIC PVCS: Primary | ICD-10-CM

## 2022-05-10 PROCEDURE — 99203 OFFICE O/P NEW LOW 30 MIN: CPT | Performed by: STUDENT IN AN ORGANIZED HEALTH CARE EDUCATION/TRAINING PROGRAM

## 2022-05-10 PROCEDURE — 93000 ELECTROCARDIOGRAM COMPLETE: CPT | Performed by: NURSE PRACTITIONER

## 2022-05-10 NOTE — PROGRESS NOTES
"       Cardiac Electrophysiology Outpatient Note  [facility]    Office Visit     Wendi Washington  [id number]  05/10/2022    Primary Care Physician: Corey Melton MD    Referred By: Corey Melton MD    CC: PVCs, second opinion    Problem List:  1. PVCs/ palpitations  a. Event monitor 2022: PVC burden 3%, bigeminy  b. Echo 03/15/2022: 51-55%  c. Lexiscan stress test negative for ischemia, low risk study  d. On metoprolol, frequent bradycardia with dizziness  2. Hypertension  3. Pulmonary embolism , DVT x3   a. On Xarelto chronically  4. COVID-2021  5. Hypothyroidism    History of Present Illness:   Wendi Washington is a 61 y.o. female who presents to the electrophysiology clinic for consultation regarding PVCs, requested by Dr. Melton. Began noticing PVCs after having COVID-19 last 2021. Wore event monitor which showed 3% PVC burden, metoprolol started with subsequent bradycardia. Flecainide initiated and metoprolol switched to time release. PVCs feel like flutters and caused lots of fatigue, have improved since flecainide initiation. Often triggered by activities, also noticed shakiness with this. Now that she is on flecainide, she notices palpitations less than once per week. Reports some L chest \"muscle cramps\" that will occur with activity or rest, lasting 1-2 minutes, no radiation. Recent lexiscan stress was negative for ischemia.  Caffeine 2-3 cups coffee per day    Past Surgical History:   Procedure Laterality Date   • BACK SURGERY     •  SECTION     • CHOLECYSTECTOMY     • HIP SURGERY     • KNEE SURGERY      right in , and left in    • LUNG SURGERY     • LUNG SURGERY      right lung   • SHOULDER SURGERY         • THYROIDECTOMY         Family History   Problem Relation Age of Onset   • Hypertension Father    • Arthritis Father    • Asthma Father    • Cancer Father        Social History     Socioeconomic History   • " Marital status:    Tobacco Use   • Smoking status: Never Smoker   • Smokeless tobacco: Never Used   Substance and Sexual Activity   • Alcohol use: No   • Drug use: No   • Sexual activity: Not Currently     Partners: Female     Birth control/protection: None         Current Outpatient Medications:   •  azelastine (ASTELIN) 0.1 % nasal spray, 2 sprays into the nostril(s) as directed by provider 2 (Two) Times a Day. Use in each nostril as directed, Disp: , Rfl:   •  Cholecalciferol (VITAMIN D PO), Take  by mouth Daily., Disp: , Rfl:   •  colchicine 0.6 MG tablet, Take 0.6 mg by mouth 2 (Two) Times a Day., Disp: , Rfl:   •  Cyanocobalamin (VITAMIN B-12 PO), Take  by mouth Daily., Disp: , Rfl:   •  flecainide (TAMBOCOR) 50 MG tablet, Take 1 tablet by mouth 2 (Two) Times a Day., Disp: 60 tablet, Rfl: 11  •  HYDROcodone-acetaminophen (NORCO) 5-325 MG per tablet, Take 1 tablet by mouth Every 6 (Six) Hours As Needed for Mild Pain ., Disp: 12 tablet, Rfl: 0  •  hydroxychloroquine (PLAQUENIL) 200 MG tablet, Take  by mouth Daily., Disp: , Rfl:   •  levothyroxine (SYNTHROID, LEVOTHROID) 137 MCG tablet, Take 137 mcg by mouth Daily., Disp: , Rfl: 5  •  metoprolol succinate XL (TOPROL-XL) 25 MG 24 hr tablet, Take 1 tablet by mouth Daily., Disp: 30 tablet, Rfl: 11  •  montelukast (SINGULAIR) 10 MG tablet, Take 10 mg by mouth Every Night., Disp: , Rfl:   •  ondansetron ODT (ZOFRAN-ODT) 4 MG disintegrating tablet, Place 1 tablet on the tongue Every 6 (Six) Hours As Needed for Nausea or Vomiting., Disp: 12 tablet, Rfl: 0  •  polyethylene glycol (MIRALAX) 17 GM/SCOOP powder, Daily., Disp: , Rfl:   •  Saccharomyces boulardii (Probiotic) 250 MG capsule, Take  by mouth Daily., Disp: , Rfl:   •  XARELTO 20 MG tablet, Take 20 mg by mouth Daily With Dinner., Disp: , Rfl: 2    Allergies:   No Known Allergies    Review of Systems:  Review of Systems   Constitutional: Negative for malaise/fatigue.   Cardiovascular: Positive for  "palpitations. Negative for chest pain, dyspnea on exertion, leg swelling, near-syncope and syncope.   Hematologic/Lymphatic: Negative for bleeding problem.        Vital Signs: Blood pressure 124/88, pulse 65, height 167.6 cm (66\"), weight 105 kg (231 lb), SpO2 97 %.    Physical Exam  Vitals reviewed.   Cardiovascular:      Rate and Rhythm: Normal rate and regular rhythm.   Pulmonary:      Effort: Pulmonary effort is normal.      Breath sounds: Normal breath sounds.   Musculoskeletal:      Right lower leg: No edema.      Left lower leg: No edema.   Skin:     General: Skin is warm and dry.   Neurological:      Mental Status: She is alert and oriented to person, place, and time.   Psychiatric:         Behavior: Behavior is cooperative.         Lab Results   Component Value Date    GLUCOSE 102 (H) 05/04/2021    CALCIUM 9.5 05/04/2021     05/04/2021    K 4.2 05/04/2021    CO2 28.6 05/04/2021     05/04/2021    BUN 10 05/04/2021    CREATININE 0.75 05/04/2021    EGFRIFAFRI  09/08/2016      Comment:      <15 Indicative of kidney failure.    EGFRIFNONA 79 05/04/2021    BCR 13.3 05/04/2021    ANIONGAP 9.4 05/04/2021     Lab Results   Component Value Date    WBC 9.98 05/04/2021    HGB 14.2 05/04/2021    HCT 43.6 05/04/2021    MCV 94.2 05/04/2021     05/04/2021     Lab Results   Component Value Date    INR <0.90 06/03/2015    PROTIME 9.4 (L) 06/03/2015     Lab Results   Component Value Date    TSH 0.811 04/21/2021    T4KDLCC 126 09/18/2017    P3YGRAU 12.60 (H) 09/18/2017        Results for orders placed during the hospital encounter of 03/14/22    Adult Transthoracic Echo Complete w/ Color, Spectral and Contrast if necessary per protocol    Interpretation Summary  · Normal left ventricular cavity size and wall thickness noted. All left ventricular wall segments contract normally.  · Left ventricular ejection fraction appears to be 51 - 55%.  · The aortic valve is structurally normal with no regurgitation or " stenosis present.  · The mitral valve is structurally normal with no regurgitation or significant stenosis present.  · There is no evidence of pericardial effusion. .      I personally viewed and interpreted the patient's EKG/Telemetry/lab data.      ECG 12 Lead    Date/Time: 5/10/2022 1:31 PM  Performed by: Bee Dominguez APRN  Authorized by: Bee Dominguez APRN   Comparison: compared with previous ECG from 4/20/2022  Similar to previous ECG  Rhythm: sinus rhythm  Rate: normal  BPM: 63  QRS axis: normal  Comments: QTc 421                Assessment & Plan    1. Symptomatic PVCs  -PVCs noticed after COVID19 infection last March.  She was originally put on metoprolol for this but this was discontinued due to bradycardia.  She was then placed on flecainide 50 mg twice daily and metoprolol succinate XL 25 daily and this has now been controlling her palpitations well.  -Event monitor from March 2022 revealed 3% PVC burden.  -We do not have any ECGs to know where her PVC origin may be coming from.  We discussed that the most important thing with PVCs is to control of symptoms.  As she feels much better now on flecainide and I think there is any need to change her therapy at the current time.    Follow Up:  Return if symptoms worsen or fail to improve.    Scribed for Bishnu Fine MD by Bee DIANA. 5/10/2022  17:19 EDT    I, Bishnu Fine MD, personally performed the services described in this documentation as scribed by the above named individual in my presence, and it is both accurate and complete.  5/10/2022  17:19 EDT

## 2022-05-24 ENCOUNTER — OFFICE VISIT (OUTPATIENT)
Dept: CARDIOLOGY | Facility: CLINIC | Age: 62
End: 2022-05-24

## 2022-05-24 VITALS
WEIGHT: 235 LBS | BODY MASS INDEX: 37.77 KG/M2 | HEART RATE: 56 BPM | HEIGHT: 66 IN | SYSTOLIC BLOOD PRESSURE: 97 MMHG | RESPIRATION RATE: 16 BRPM | DIASTOLIC BLOOD PRESSURE: 66 MMHG

## 2022-05-24 DIAGNOSIS — I49.3 SYMPTOMATIC PVCS: Primary | ICD-10-CM

## 2022-05-24 DIAGNOSIS — R00.1 BRADYCARDIA, SINUS: ICD-10-CM

## 2022-05-24 PROCEDURE — 99213 OFFICE O/P EST LOW 20 MIN: CPT | Performed by: NURSE PRACTITIONER

## 2022-05-24 NOTE — PROGRESS NOTES
Corey Melton MD  Wendi Washington  1960 05/24/2022    Patient Active Problem List   Diagnosis   • PVC (premature ventricular contraction)       Dear Corey Melton MD:    Subjective     Chief Complaint   Patient presents with   • Palpitations     5 week follow med therapy   • Edema     LE   • Med Management     List provided           History of Present Illness:    Wendi Washington is a 61 y.o. female with a past medical history of hypertension and symptomatic PVCs.  She presents today for cardiology follow-up. Since starting flecainide her palpitations have nearly resolved. She is tolerating flecainide well. QRS was acceptable on EKG. Her heart rate has improved since decreasing metoprolol dosage. She occasionally has hypotension with some weakness but overall BP has been in the 120's/80's.          No Known Allergies:      Current Outpatient Medications:   •  azelastine (ASTELIN) 0.1 % nasal spray, 2 sprays into the nostril(s) as directed by provider 2 (Two) Times a Day. Use in each nostril as directed, Disp: , Rfl:   •  Cholecalciferol (VITAMIN D PO), Take 1,000 Units by mouth Daily., Disp: , Rfl:   •  colchicine 0.6 MG tablet, Take 0.6 mg by mouth Daily., Disp: , Rfl:   •  Cyanocobalamin (VITAMIN B-12 PO), Take  by mouth Daily., Disp: , Rfl:   •  flecainide (TAMBOCOR) 50 MG tablet, Take 1 tablet by mouth 2 (Two) Times a Day., Disp: 60 tablet, Rfl: 11  •  HYDROcodone-acetaminophen (NORCO) 5-325 MG per tablet, Take 1 tablet by mouth Every 6 (Six) Hours As Needed for Mild Pain ., Disp: 12 tablet, Rfl: 0  •  hydroxychloroquine (PLAQUENIL) 200 MG tablet, Take  by mouth 2 (Two) Times a Day., Disp: , Rfl:   •  levothyroxine (SYNTHROID, LEVOTHROID) 137 MCG tablet, Take 137 mcg by mouth Daily., Disp: , Rfl: 5  •  metoprolol succinate XL (TOPROL-XL) 25 MG 24 hr tablet, Take 1 tablet by mouth Daily., Disp: 30 tablet, Rfl: 11  •  montelukast (SINGULAIR) 10 MG tablet, Take 10 mg by mouth Every Night., Disp: , Rfl:   •  " ondansetron ODT (ZOFRAN-ODT) 4 MG disintegrating tablet, Place 1 tablet on the tongue Every 6 (Six) Hours As Needed for Nausea or Vomiting., Disp: 12 tablet, Rfl: 0  •  polyethylene glycol (MIRALAX) 17 GM/SCOOP powder, Daily., Disp: , Rfl:   •  Saccharomyces boulardii (Probiotic) 250 MG capsule, Take  by mouth Daily., Disp: , Rfl:   •  XARELTO 20 MG tablet, Take 20 mg by mouth Daily With Dinner., Disp: , Rfl: 2      The following portions of the patient's history were reviewed and updated as appropriate: allergies, current medications, past family history, past medical history, past social history, past surgical history and problem list.    Social History     Tobacco Use   • Smoking status: Never Smoker   • Smokeless tobacco: Never Used   Substance Use Topics   • Alcohol use: No   • Drug use: No       ROS    Objective   Vitals:    05/24/22 1032 05/24/22 1045   BP: 99/68 97/66   BP Location: Right arm Left arm   Pulse: 56 56   Resp: 16    Weight: 107 kg (235 lb)    Height: 167.6 cm (66\")      Body mass index is 37.93 kg/m².        Vitals reviewed.   Constitutional:       Appearance: Healthy appearance. Well-developed and not in distress.   HENT:      Head: Normocephalic and atraumatic.   Neck:      Vascular: No JVD.   Pulmonary:      Effort: Pulmonary effort is normal.      Breath sounds: Normal breath sounds. No wheezing. No rales.   Cardiovascular:      Normal rate. Regular rhythm.      Murmurs: There is no murmur.      . No S3 and S4 gallop.   Edema:     Peripheral edema absent.   Abdominal:      General: Bowel sounds are normal.      Palpations: Abdomen is soft.   Skin:     General: Skin is warm and dry.   Neurological:      Mental Status: Alert, oriented to person, place, and time and oriented to person, place and time.   Psychiatric:         Mood and Affect: Mood normal.         Behavior: Behavior normal.         Lab Results   Component Value Date     05/04/2021    K 4.2 05/04/2021     05/04/2021 "    CO2 28.6 05/04/2021    BUN 10 05/04/2021    CREATININE 0.75 05/04/2021    GLUCOSE 102 (H) 05/04/2021    CALCIUM 9.5 05/04/2021    AST 20 05/04/2021    ALT 10 05/04/2021    ALKPHOS 102 05/04/2021    LABIL2 1.4 (L) 05/26/2016     No results found for: CKTOTAL  Lab Results   Component Value Date    WBC 9.98 05/04/2021    HGB 14.2 05/04/2021    HCT 43.6 05/04/2021     05/04/2021     Lab Results   Component Value Date    INR <0.90 06/03/2015     No results found for: MG  Lab Results   Component Value Date    TSH 0.811 04/21/2021    CHLPL 219 (H) 05/26/2016    TRIG 99 04/21/2021    HDL 55 04/21/2021     (H) 04/21/2021      No results found for: BNP        Procedures      Assessment & Plan    Diagnosis Plan   1. Symptomatic PVCs     2. Bradycardia, sinus                  Recommendations:    1. Symptomatic PVC's - improved with flecainide therapy, will continue current dose and monitor.  2. Sinus bradycardia - improved after decreasing metoprolol dosage. If she has any further symptoms or hypotension, can decrease metoprolol succinate to 12.5 mg daily.  3. Follow up in 3 months or sooner if needed.         Return in about 3 months (around 8/24/2022) for Recheck.    As always, I appreciate very much the opportunity to participate in the cardiovascular care of your patients.      With Best Regards,    LEBRON Caceres

## 2022-06-20 ENCOUNTER — LAB (OUTPATIENT)
Dept: LAB | Facility: HOSPITAL | Age: 62
End: 2022-06-20

## 2022-06-20 ENCOUNTER — TRANSCRIBE ORDERS (OUTPATIENT)
Dept: ADMINISTRATIVE | Facility: HOSPITAL | Age: 62
End: 2022-06-20

## 2022-06-20 DIAGNOSIS — E53.8 DEFICIENCY OF OTHER SPECIFIED B GROUP VITAMINS: ICD-10-CM

## 2022-06-20 DIAGNOSIS — E55.9 VITAMIN D DEFICIENCY, UNSPECIFIED: ICD-10-CM

## 2022-06-20 DIAGNOSIS — E03.9 HYPOTHYROIDISM, UNSPECIFIED TYPE: ICD-10-CM

## 2022-06-20 DIAGNOSIS — E53.9 VITAMIN B-COMPLEX DEFICIENCY: ICD-10-CM

## 2022-06-20 DIAGNOSIS — E03.9 HYPOTHYROIDISM, UNSPECIFIED TYPE: Primary | ICD-10-CM

## 2022-06-20 DIAGNOSIS — I49.3 SYMPTOMATIC PVCS: ICD-10-CM

## 2022-06-20 DIAGNOSIS — E78.2 MIXED HYPERLIPIDEMIA: ICD-10-CM

## 2022-06-20 LAB
25(OH)D3 SERPL-MCNC: 52.3 NG/ML (ref 30–100)
ALBUMIN SERPL-MCNC: 4.2 G/DL (ref 3.5–5.2)
ALBUMIN/GLOB SERPL: 1.3 G/DL
ALP SERPL-CCNC: 102 U/L (ref 39–117)
ALT SERPL W P-5'-P-CCNC: 17 U/L (ref 1–33)
ANION GAP SERPL CALCULATED.3IONS-SCNC: 12.3 MMOL/L (ref 5–15)
AST SERPL-CCNC: 16 U/L (ref 1–32)
BASOPHILS # BLD AUTO: 0.07 10*3/MM3 (ref 0–0.2)
BASOPHILS NFR BLD AUTO: 0.9 % (ref 0–1.5)
BILIRUB SERPL-MCNC: 0.4 MG/DL (ref 0–1.2)
BUN SERPL-MCNC: 9 MG/DL (ref 8–23)
BUN/CREAT SERPL: 10.1 (ref 7–25)
CALCIUM SPEC-SCNC: 9.5 MG/DL (ref 8.6–10.5)
CHLORIDE SERPL-SCNC: 105 MMOL/L (ref 98–107)
CHOLEST SERPL-MCNC: 186 MG/DL (ref 0–200)
CO2 SERPL-SCNC: 24.7 MMOL/L (ref 22–29)
CREAT SERPL-MCNC: 0.89 MG/DL (ref 0.57–1)
DEPRECATED RDW RBC AUTO: 39 FL (ref 37–54)
EGFRCR SERPLBLD CKD-EPI 2021: 73.9 ML/MIN/1.73
EOSINOPHIL # BLD AUTO: 0.29 10*3/MM3 (ref 0–0.4)
EOSINOPHIL NFR BLD AUTO: 3.8 % (ref 0.3–6.2)
ERYTHROCYTE [DISTWIDTH] IN BLOOD BY AUTOMATED COUNT: 12.2 % (ref 12.3–15.4)
FOLATE SERPL-MCNC: 14.7 NG/ML (ref 4.78–24.2)
GLOBULIN UR ELPH-MCNC: 3.2 GM/DL
GLUCOSE SERPL-MCNC: 112 MG/DL (ref 65–99)
HBA1C MFR BLD: 6.2 % (ref 4.8–5.6)
HCT VFR BLD AUTO: 41.1 % (ref 34–46.6)
HDLC SERPL-MCNC: 38 MG/DL (ref 40–60)
HGB BLD-MCNC: 14.4 G/DL (ref 12–15.9)
IMM GRANULOCYTES # BLD AUTO: 0.02 10*3/MM3 (ref 0–0.05)
IMM GRANULOCYTES NFR BLD AUTO: 0.3 % (ref 0–0.5)
LDLC SERPL CALC-MCNC: 108 MG/DL (ref 0–100)
LDLC/HDLC SERPL: 2.68 {RATIO}
LYMPHOCYTES # BLD AUTO: 1.75 10*3/MM3 (ref 0.7–3.1)
LYMPHOCYTES NFR BLD AUTO: 23 % (ref 19.6–45.3)
MAGNESIUM SERPL-MCNC: 2.2 MG/DL (ref 1.6–2.4)
MCH RBC QN AUTO: 31.1 PG (ref 26.6–33)
MCHC RBC AUTO-ENTMCNC: 35 G/DL (ref 31.5–35.7)
MCV RBC AUTO: 88.8 FL (ref 79–97)
MONOCYTES # BLD AUTO: 0.55 10*3/MM3 (ref 0.1–0.9)
MONOCYTES NFR BLD AUTO: 7.2 % (ref 5–12)
NEUTROPHILS NFR BLD AUTO: 4.94 10*3/MM3 (ref 1.7–7)
NEUTROPHILS NFR BLD AUTO: 64.8 % (ref 42.7–76)
NRBC BLD AUTO-RTO: 0 /100 WBC (ref 0–0.2)
PLATELET # BLD AUTO: 316 10*3/MM3 (ref 140–450)
PMV BLD AUTO: 10 FL (ref 6–12)
POTASSIUM SERPL-SCNC: 4.6 MMOL/L (ref 3.5–5.2)
PROT SERPL-MCNC: 7.4 G/DL (ref 6–8.5)
RBC # BLD AUTO: 4.63 10*6/MM3 (ref 3.77–5.28)
SODIUM SERPL-SCNC: 142 MMOL/L (ref 136–145)
T-UPTAKE NFR SERPL: 1 TBI (ref 0.8–1.3)
T4 SERPL-MCNC: 7.63 MCG/DL (ref 4.5–11.7)
TRIGL SERPL-MCNC: 231 MG/DL (ref 0–150)
TSH SERPL DL<=0.05 MIU/L-ACNC: 0.27 UIU/ML (ref 0.27–4.2)
URATE SERPL-MCNC: 7.4 MG/DL (ref 2.4–5.7)
VIT B12 BLD-MCNC: 554 PG/ML (ref 211–946)
VLDLC SERPL-MCNC: 40 MG/DL (ref 5–40)
WBC NRBC COR # BLD: 7.62 10*3/MM3 (ref 3.4–10.8)

## 2022-06-20 PROCEDURE — 84436 ASSAY OF TOTAL THYROXINE: CPT

## 2022-06-20 PROCEDURE — 82607 VITAMIN B-12: CPT

## 2022-06-20 PROCEDURE — 80061 LIPID PANEL: CPT

## 2022-06-20 PROCEDURE — 83735 ASSAY OF MAGNESIUM: CPT

## 2022-06-20 PROCEDURE — 84479 ASSAY OF THYROID (T3 OR T4): CPT

## 2022-06-20 PROCEDURE — 36415 COLL VENOUS BLD VENIPUNCTURE: CPT

## 2022-06-20 PROCEDURE — 84550 ASSAY OF BLOOD/URIC ACID: CPT

## 2022-06-20 PROCEDURE — 80050 GENERAL HEALTH PANEL: CPT

## 2022-06-20 PROCEDURE — 83036 HEMOGLOBIN GLYCOSYLATED A1C: CPT

## 2022-06-20 PROCEDURE — 82306 VITAMIN D 25 HYDROXY: CPT

## 2022-06-20 PROCEDURE — 82746 ASSAY OF FOLIC ACID SERUM: CPT

## 2022-09-26 ENCOUNTER — OFFICE VISIT (OUTPATIENT)
Dept: CARDIOLOGY | Facility: CLINIC | Age: 62
End: 2022-09-26

## 2022-09-26 VITALS
HEART RATE: 66 BPM | BODY MASS INDEX: 38.38 KG/M2 | WEIGHT: 237.8 LBS | SYSTOLIC BLOOD PRESSURE: 128 MMHG | DIASTOLIC BLOOD PRESSURE: 81 MMHG

## 2022-09-26 DIAGNOSIS — I49.3 SYMPTOMATIC PVCS: Primary | ICD-10-CM

## 2022-09-26 DIAGNOSIS — R00.1 BRADYCARDIA, SINUS: ICD-10-CM

## 2022-09-26 DIAGNOSIS — Z13.6 ENCOUNTER FOR ABDOMINAL AORTIC ANEURYSM (AAA) SCREENING: ICD-10-CM

## 2022-09-26 PROCEDURE — 99214 OFFICE O/P EST MOD 30 MIN: CPT | Performed by: NURSE PRACTITIONER

## 2022-09-26 RX ORDER — ALBUTEROL SULFATE 90 UG/1
AEROSOL, METERED RESPIRATORY (INHALATION)
COMMUNITY
Start: 2021-12-29

## 2022-09-26 RX ORDER — IBUPROFEN 800 MG/1
TABLET ORAL EVERY 12 HOURS
COMMUNITY
Start: 2021-08-24

## 2022-09-26 RX ORDER — TIRZEPATIDE 5 MG/.5ML
INJECTION, SOLUTION SUBCUTANEOUS
COMMUNITY
Start: 2022-09-13

## 2022-09-26 RX ORDER — OXYBUTYNIN CHLORIDE 10 MG/1
TABLET, EXTENDED RELEASE ORAL
COMMUNITY
Start: 2021-12-29 | End: 2023-10-29

## 2022-09-26 NOTE — PROGRESS NOTES
Corey Melton MD  Wendi Washington  1960 09/26/2022    Patient Active Problem List   Diagnosis   • PVC (premature ventricular contraction)       Dear Corey Melton MD:    Subjective     Chief Complaint   Patient presents with   • Follow-up     ROUTINE           History of Present Illness:    Wendi Washington is a 62 y.o. female with a past medical history of hypertension and symptomatic PVCs.  She presents today for cardiology follow-up. She reports she has been feeling great. She is tolerating the flecainide very well.  The patient denies chest pain, shortness of breath, palpitations, dizziness, lightheadedness, near syncope, and syncope. She has not had any more bradycardia or hypotension. She has noticed her abdomen pulsates sometimes and she is worried about an aneurysm.            No Known Allergies:      Current Outpatient Medications:   •  albuterol sulfate  (90 Base) MCG/ACT inhaler, ProAir  (90 Base) MCG/ACT Inhalation Aerosol Solution, Disp: , Rfl:   •  azelastine (ASTELIN) 0.1 % nasal spray, 2 sprays into the nostril(s) as directed by provider 2 (Two) Times a Day. Use in each nostril as directed, Disp: , Rfl:   •  Cholecalciferol (VITAMIN D PO), Take 1,000 Units by mouth Daily., Disp: , Rfl:   •  Cyanocobalamin (VITAMIN B-12 PO), Take  by mouth Daily., Disp: , Rfl:   •  flecainide (TAMBOCOR) 50 MG tablet, Take 1 tablet by mouth 2 (Two) Times a Day., Disp: 60 tablet, Rfl: 11  •  HYDROcodone-acetaminophen (NORCO) 5-325 MG per tablet, Take 1 tablet by mouth Every 6 (Six) Hours As Needed for Mild Pain ., Disp: 12 tablet, Rfl: 0  •  hydroxychloroquine (PLAQUENIL) 200 MG tablet, Take  by mouth 2 (Two) Times a Day., Disp: , Rfl:   •  ibuprofen (ADVIL,MOTRIN) 800 MG tablet, Take  by mouth Every 12 (Twelve) Hours., Disp: , Rfl:   •  levothyroxine (SYNTHROID, LEVOTHROID) 137 MCG tablet, Take 137 mcg by mouth Daily., Disp: , Rfl: 5  •  metoprolol succinate XL (TOPROL-XL) 25 MG 24 hr tablet, Take  1 tablet by mouth Daily., Disp: 30 tablet, Rfl: 11  •  montelukast (SINGULAIR) 10 MG tablet, Take 10 mg by mouth Every Night., Disp: , Rfl:   •  Mounjaro 5 MG/0.5ML solution pen-injector, , Disp: , Rfl:   •  ondansetron ODT (ZOFRAN-ODT) 4 MG disintegrating tablet, Place 1 tablet on the tongue Every 6 (Six) Hours As Needed for Nausea or Vomiting., Disp: 12 tablet, Rfl: 0  •  oxybutynin XL (DITROPAN-XL) 10 MG 24 hr tablet, Oxybutynin Chloride ER 10 MG Oral Tablet Extended Release 24 Hour, Disp: , Rfl:   •  polyethylene glycol (MIRALAX) 17 GM/SCOOP powder, Daily., Disp: , Rfl:   •  Saccharomyces boulardii (Probiotic) 250 MG capsule, Take  by mouth Daily., Disp: , Rfl:   •  XARELTO 20 MG tablet, Take 20 mg by mouth Daily With Dinner., Disp: , Rfl: 2  •  colchicine 0.6 MG tablet, Take 0.6 mg by mouth Daily., Disp: , Rfl:       The following portions of the patient's history were reviewed and updated as appropriate: allergies, current medications, past family history, past medical history, past social history, past surgical history and problem list.    Social History     Tobacco Use   • Smoking status: Never Smoker   • Smokeless tobacco: Never Used   Substance Use Topics   • Alcohol use: No   • Drug use: No       ROS    Objective   Vitals:    09/26/22 1021   BP: 128/81   Pulse: 66   Weight: 108 kg (237 lb 12.8 oz)     Body mass index is 38.38 kg/m².        Vitals reviewed.   Constitutional:       Appearance: Healthy appearance. Well-developed and not in distress.   HENT:      Head: Normocephalic and atraumatic.   Neck:      Vascular: No JVD.   Pulmonary:      Effort: Pulmonary effort is normal.      Breath sounds: Normal breath sounds. No wheezing. No rales.   Cardiovascular:      Normal rate. Regular rhythm.      Murmurs: There is no murmur.      . No S3 and S4 gallop.   Edema:     Peripheral edema absent.   Abdominal:      General: Bowel sounds are normal.      Palpations: Abdomen is soft.   Skin:     General: Skin is  warm and dry.   Neurological:      Mental Status: Alert, oriented to person, place, and time and oriented to person, place and time.   Psychiatric:         Mood and Affect: Mood normal.         Behavior: Behavior normal.         Lab Results   Component Value Date     06/20/2022    K 4.6 06/20/2022     06/20/2022    CO2 24.7 06/20/2022    BUN 9 06/20/2022    CREATININE 0.89 06/20/2022    GLUCOSE 112 (H) 06/20/2022    CALCIUM 9.5 06/20/2022    AST 16 06/20/2022    ALT 17 06/20/2022    ALKPHOS 102 06/20/2022    LABIL2 1.4 (L) 05/26/2016     No results found for: CKTOTAL  Lab Results   Component Value Date    WBC 7.62 06/20/2022    HGB 14.4 06/20/2022    HCT 41.1 06/20/2022     06/20/2022     Lab Results   Component Value Date    INR <0.90 06/03/2015     Lab Results   Component Value Date    MG 2.2 06/20/2022     Lab Results   Component Value Date    TSH 0.273 06/20/2022    CHLPL 219 (H) 05/26/2016    TRIG 231 (H) 06/20/2022    HDL 38 (L) 06/20/2022     (H) 06/20/2022      No results found for: BNP        Procedures      Assessment & Plan    Diagnosis Plan   1. Symptomatic PVCs     2. Bradycardia, sinus     3. Encounter for abdominal aortic aneurysm (AAA) screening  US AAA Screen Limited                Recommendations:    1. Symptomatic PVC's-continue flecainide and metoprolol at current doses.   2. Sinus bradycardia - has resolved.  3. AAA ultrasound ordered today to screen for AAA.   4. Follow up in 4 months or sooner if needed.         Return in about 4 months (around 1/26/2023) for Recheck.    As always, I appreciate very much the opportunity to participate in the cardiovascular care of your patients.      With Best Regards,    LEBRON Caceres

## 2022-10-12 ENCOUNTER — APPOINTMENT (OUTPATIENT)
Dept: ULTRASOUND IMAGING | Facility: HOSPITAL | Age: 62
End: 2022-10-12

## 2022-11-09 ENCOUNTER — TRANSCRIBE ORDERS (OUTPATIENT)
Dept: ADMINISTRATIVE | Facility: HOSPITAL | Age: 62
End: 2022-11-09

## 2022-11-09 DIAGNOSIS — M51.16 LUMBAR DISC DISEASE WITH RADICULOPATHY: Primary | ICD-10-CM

## 2023-01-24 ENCOUNTER — TELEPHONE (OUTPATIENT)
Dept: CARDIOLOGY | Facility: CLINIC | Age: 63
End: 2023-01-24
Payer: COMMERCIAL

## 2023-01-24 NOTE — TELEPHONE ENCOUNTER
Called patient to see if she was planning on r/s her AAA US. The appointment is to discuss this-will need to cancel and r/s appt if so    No answer-Left VM for callback

## 2023-01-25 NOTE — TELEPHONE ENCOUNTER
HUB CAN READ:     Tried to call pt no answer and I also called pts son Torin Washington which is on the pts verbal release and no answer LVM.  Pt has not had AAA U/S therefore her follow up appointment needs to be rescheduled till she gets this ultra sound.  So please make pt aware she needs U/S and to follow up after she gets that done.

## 2023-02-20 ENCOUNTER — TELEPHONE (OUTPATIENT)
Dept: CARDIOLOGY | Facility: CLINIC | Age: 63
End: 2023-02-20
Payer: COMMERCIAL

## 2023-04-26 ENCOUNTER — TELEPHONE (OUTPATIENT)
Dept: CARDIOLOGY | Facility: CLINIC | Age: 63
End: 2023-04-26
Payer: COMMERCIAL

## 2023-04-26 NOTE — TELEPHONE ENCOUNTER
Hub can read.     Called pt to see if she going to get the US Patricia ordered at last visit. If so she will need to RS her apt tomorrow and contact the hospital to get it scheduled. No answer STEPHAN.

## 2023-04-27 ENCOUNTER — OFFICE VISIT (OUTPATIENT)
Dept: CARDIOLOGY | Facility: CLINIC | Age: 63
End: 2023-04-27
Payer: COMMERCIAL

## 2023-04-27 VITALS
RESPIRATION RATE: 16 BRPM | SYSTOLIC BLOOD PRESSURE: 104 MMHG | WEIGHT: 213.2 LBS | DIASTOLIC BLOOD PRESSURE: 71 MMHG | HEART RATE: 66 BPM | HEIGHT: 66 IN | OXYGEN SATURATION: 94 % | BODY MASS INDEX: 34.27 KG/M2

## 2023-04-27 DIAGNOSIS — I49.3 SYMPTOMATIC PVCS: Primary | ICD-10-CM

## 2023-04-27 PROCEDURE — 93000 ELECTROCARDIOGRAM COMPLETE: CPT | Performed by: NURSE PRACTITIONER

## 2023-04-27 PROCEDURE — 99213 OFFICE O/P EST LOW 20 MIN: CPT | Performed by: NURSE PRACTITIONER

## 2023-04-27 RX ORDER — FLECAINIDE ACETATE 50 MG/1
50 TABLET ORAL 2 TIMES DAILY
Qty: 60 TABLET | Refills: 11 | Status: SHIPPED | OUTPATIENT
Start: 2023-04-27

## 2023-04-27 RX ORDER — METOPROLOL SUCCINATE 25 MG/1
25 TABLET, EXTENDED RELEASE ORAL DAILY
Qty: 30 TABLET | Refills: 11 | Status: SHIPPED | OUTPATIENT
Start: 2023-04-27

## 2023-04-27 NOTE — PROGRESS NOTES
Corey Melton MD  Wendi Washington  1960 04/27/2023    Patient Active Problem List   Diagnosis   • PVC (premature ventricular contraction)       Dear Corey Melton MD:    Subjective     Chief Complaint   Patient presents with   • Follow-up     7 mos   • Palpitations     occas   • Med Management     verbal           History of Present Illness:    Wendi Washington is a 62 y.o. female with a past medical history of hypertension and symptomatic PVCs on flecainide.  She presents today for cardiology follow-up.  Reports has been doing very well. The patient denies chest pain, shortness of breath, palpitations, dizziness, lightheadedness, near syncope, and syncope.            No Known Allergies:      Current Outpatient Medications:   •  albuterol sulfate  (90 Base) MCG/ACT inhaler, ProAir  (90 Base) MCG/ACT Inhalation Aerosol Solution, Disp: , Rfl:   •  azelastine (ASTELIN) 0.1 % nasal spray, 2 sprays into the nostril(s) as directed by provider 2 (Two) Times a Day. Use in each nostril as directed, Disp: , Rfl:   •  Cholecalciferol (VITAMIN D PO), Take 1,000 Units by mouth Daily., Disp: , Rfl:   •  colchicine 0.6 MG tablet, Take 1 tablet by mouth Daily., Disp: , Rfl:   •  Cyanocobalamin (VITAMIN B-12 PO), Take  by mouth Daily., Disp: , Rfl:   •  flecainide (TAMBOCOR) 50 MG tablet, Take 1 tablet by mouth 2 (Two) Times a Day., Disp: 60 tablet, Rfl: 11  •  hydroxychloroquine (PLAQUENIL) 200 MG tablet, Take  by mouth 2 (Two) Times a Day., Disp: , Rfl:   •  ibuprofen (ADVIL,MOTRIN) 800 MG tablet, Take  by mouth Every 12 (Twelve) Hours., Disp: , Rfl:   •  levothyroxine (SYNTHROID, LEVOTHROID) 137 MCG tablet, Take 1 tablet by mouth Daily., Disp: , Rfl: 5  •  metoprolol succinate XL (TOPROL-XL) 25 MG 24 hr tablet, Take 1 tablet by mouth Daily., Disp: 30 tablet, Rfl: 11  •  montelukast (SINGULAIR) 10 MG tablet, Take 1 tablet by mouth Every Night., Disp: , Rfl:   •  Mounjaro 5 MG/0.5ML solution pen-injector, ,  "Disp: , Rfl:   •  ondansetron ODT (ZOFRAN-ODT) 4 MG disintegrating tablet, Place 1 tablet on the tongue Every 6 (Six) Hours As Needed for Nausea or Vomiting., Disp: 12 tablet, Rfl: 0  •  oxybutynin XL (DITROPAN-XL) 10 MG 24 hr tablet, Oxybutynin Chloride ER 10 MG Oral Tablet Extended Release 24 Hour, Disp: , Rfl:   •  polyethylene glycol (MIRALAX) 17 GM/SCOOP powder, Daily., Disp: , Rfl:   •  Saccharomyces boulardii (Probiotic) 250 MG capsule, Take  by mouth Daily., Disp: , Rfl:   •  XARELTO 20 MG tablet, Take 1 tablet by mouth Daily With Dinner., Disp: , Rfl: 2  •  HYDROcodone-acetaminophen (NORCO) 5-325 MG per tablet, Take 1 tablet by mouth Every 6 (Six) Hours As Needed for Mild Pain . (Patient not taking: Reported on 4/27/2023), Disp: 12 tablet, Rfl: 0      The following portions of the patient's history were reviewed and updated as appropriate: allergies, current medications, past family history, past medical history, past social history, past surgical history and problem list.    Social History     Tobacco Use   • Smoking status: Never   • Smokeless tobacco: Never   Substance Use Topics   • Alcohol use: No   • Drug use: No       Review of Systems   Constitutional: Negative for decreased appetite and malaise/fatigue.   Cardiovascular: Negative for chest pain, dyspnea on exertion and palpitations.   Respiratory: Negative for cough and shortness of breath.        Objective   Vitals:    04/27/23 1416   BP: 104/71   Pulse: 66   Resp: 16   SpO2: 94%   Weight: 96.7 kg (213 lb 3.2 oz)   Height: 167.6 cm (66\")     Body mass index is 34.41 kg/m².        Vitals reviewed.   Constitutional:       Appearance: Healthy appearance. Well-developed and not in distress.   HENT:      Head: Normocephalic and atraumatic.   Neck:      Vascular: No JVD.   Pulmonary:      Effort: Pulmonary effort is normal.      Breath sounds: Normal breath sounds. No wheezing. No rales.   Cardiovascular:      Normal rate. Regular rhythm.      Murmurs: " There is no murmur.      . No S3 and S4 gallop.   Abdominal:      General: Bowel sounds are normal.      Palpations: Abdomen is soft.   Skin:     General: Skin is warm and dry.   Neurological:      Mental Status: Alert, oriented to person, place, and time and oriented to person, place and time.   Psychiatric:         Mood and Affect: Mood normal.         Behavior: Behavior normal.         Lab Results   Component Value Date     06/20/2022    K 4.6 06/20/2022     06/20/2022    CO2 24.7 06/20/2022    BUN 9 06/20/2022    CREATININE 0.89 06/20/2022    GLUCOSE 112 (H) 06/20/2022    CALCIUM 9.5 06/20/2022    AST 16 06/20/2022    ALT 17 06/20/2022    ALKPHOS 102 06/20/2022    LABIL2 1.4 (L) 05/26/2016     No results found for: CKTOTAL  Lab Results   Component Value Date    WBC 7.62 06/20/2022    HGB 14.4 06/20/2022    HCT 41.1 06/20/2022     06/20/2022     Lab Results   Component Value Date    INR <0.90 06/03/2015     Lab Results   Component Value Date    MG 2.2 06/20/2022     Lab Results   Component Value Date    TSH 0.273 06/20/2022    CHLPL 219 (H) 05/26/2016    TRIG 231 (H) 06/20/2022    HDL 38 (L) 06/20/2022     (H) 06/20/2022      No results found for: BNP          ECG 12 Lead    Date/Time: 4/27/2023 2:17 PM  Performed by: Patricia Simmons APRN  Authorized by: Patricia Simmons APRN   Comparison: compared with previous ECG   Similar to previous ECG  Rhythm: sinus rhythm  BPM: 67  Other findings: non-specific ST-T wave changes  Comments:  ms              Assessment & Plan    Diagnosis Plan   1. Symptomatic PVCs  ECG 12 Lead    flecainide (TAMBOCOR) 50 MG tablet    metoprolol succinate XL (TOPROL-XL) 25 MG 24 hr tablet                   Recommendations:    Symptomatic PVCs- maintaining sinus rhythm on flecainide and metoprolol, will continue current doses.      Return in about 6 months (around 10/27/2023) for Recheck.    As always, I appreciate very much the opportunity to participate  in the cardiovascular care of your patients.      With Best Regards,    LEBRON Caceres

## 2023-04-27 NOTE — LETTER
April 27, 2023     Corey Melton MD  [address] [facility] [place] [address]    Patient: Wendi Washington   YOB: 1960   Date of Visit: 4/27/2023       Dear Dr. Geronimo MD:    Thank you for referring Wendi Washington to me for evaluation. Below are the relevant portions of my assessment and plan of care.    If you have questions, please do not hesitate to call me. I look forward to following Wendi along with you.         Sincerely,        LEBRON Caceres        CC: No Recipients    Particia Simmons APRN  04/27/23 1602  Signed  Corey Melton MD  Wendi Washington  1960 04/27/2023    Patient Active Problem List   Diagnosis   • PVC (premature ventricular contraction)       Dear Corey Melton MD:    Subjective      Chief Complaint   Patient presents with   • Follow-up     7 mos   • Palpitations     occas   • Med Management     verbal           History of Present Illness:    Wendi Washington is a 62 y.o. female with a past medical history of hypertension and symptomatic PVCs on flecainide.  She presents today for cardiology follow-up.  Reports has been doing very well. The patient denies chest pain, shortness of breath, palpitations, dizziness, lightheadedness, near syncope, and syncope.            No Known Allergies:      Current Outpatient Medications:   •  albuterol sulfate  (90 Base) MCG/ACT inhaler, ProAir  (90 Base) MCG/ACT Inhalation Aerosol Solution, Disp: , Rfl:   •  azelastine (ASTELIN) 0.1 % nasal spray, 2 sprays into the nostril(s) as directed by provider 2 (Two) Times a Day. Use in each nostril as directed, Disp: , Rfl:   •  Cholecalciferol (VITAMIN D PO), Take 1,000 Units by mouth Daily., Disp: , Rfl:   •  colchicine 0.6 MG tablet, Take 1 tablet by mouth Daily., Disp: , Rfl:   •  Cyanocobalamin (VITAMIN B-12 PO), Take  by mouth Daily., Disp: , Rfl:   •  flecainide (TAMBOCOR) 50 MG tablet, Take 1 tablet by mouth 2 (Two) Times a Day., Disp: 60 tablet, Rfl: 11  •   hydroxychloroquine (PLAQUENIL) 200 MG tablet, Take  by mouth 2 (Two) Times a Day., Disp: , Rfl:   •  ibuprofen (ADVIL,MOTRIN) 800 MG tablet, Take  by mouth Every 12 (Twelve) Hours., Disp: , Rfl:   •  levothyroxine (SYNTHROID, LEVOTHROID) 137 MCG tablet, Take 1 tablet by mouth Daily., Disp: , Rfl: 5  •  metoprolol succinate XL (TOPROL-XL) 25 MG 24 hr tablet, Take 1 tablet by mouth Daily., Disp: 30 tablet, Rfl: 11  •  montelukast (SINGULAIR) 10 MG tablet, Take 1 tablet by mouth Every Night., Disp: , Rfl:   •  Mounjaro 5 MG/0.5ML solution pen-injector, , Disp: , Rfl:   •  ondansetron ODT (ZOFRAN-ODT) 4 MG disintegrating tablet, Place 1 tablet on the tongue Every 6 (Six) Hours As Needed for Nausea or Vomiting., Disp: 12 tablet, Rfl: 0  •  oxybutynin XL (DITROPAN-XL) 10 MG 24 hr tablet, Oxybutynin Chloride ER 10 MG Oral Tablet Extended Release 24 Hour, Disp: , Rfl:   •  polyethylene glycol (MIRALAX) 17 GM/SCOOP powder, Daily., Disp: , Rfl:   •  Saccharomyces boulardii (Probiotic) 250 MG capsule, Take  by mouth Daily., Disp: , Rfl:   •  XARELTO 20 MG tablet, Take 1 tablet by mouth Daily With Dinner., Disp: , Rfl: 2  •  HYDROcodone-acetaminophen (NORCO) 5-325 MG per tablet, Take 1 tablet by mouth Every 6 (Six) Hours As Needed for Mild Pain . (Patient not taking: Reported on 4/27/2023), Disp: 12 tablet, Rfl: 0      The following portions of the patient's history were reviewed and updated as appropriate: allergies, current medications, past family history, past medical history, past social history, past surgical history and problem list.    Social History     Tobacco Use   • Smoking status: Never   • Smokeless tobacco: Never   Substance Use Topics   • Alcohol use: No   • Drug use: No       Review of Systems   Constitutional: Negative for decreased appetite and malaise/fatigue.   Cardiovascular: Negative for chest pain, dyspnea on exertion and palpitations.   Respiratory: Negative for cough and shortness of breath.   "      Objective    Vitals:    04/27/23 1416   BP: 104/71   Pulse: 66   Resp: 16   SpO2: 94%   Weight: 96.7 kg (213 lb 3.2 oz)   Height: 167.6 cm (66\")     Body mass index is 34.41 kg/m².        Vitals reviewed.   Constitutional:       Appearance: Healthy appearance. Well-developed and not in distress.   HENT:      Head: Normocephalic and atraumatic.   Neck:      Vascular: No JVD.   Pulmonary:      Effort: Pulmonary effort is normal.      Breath sounds: Normal breath sounds. No wheezing. No rales.   Cardiovascular:      Normal rate. Regular rhythm.      Murmurs: There is no murmur.      . No S3 and S4 gallop.   Abdominal:      General: Bowel sounds are normal.      Palpations: Abdomen is soft.   Skin:     General: Skin is warm and dry.   Neurological:      Mental Status: Alert, oriented to person, place, and time and oriented to person, place and time.   Psychiatric:         Mood and Affect: Mood normal.         Behavior: Behavior normal.         Lab Results   Component Value Date     06/20/2022    K 4.6 06/20/2022     06/20/2022    CO2 24.7 06/20/2022    BUN 9 06/20/2022    CREATININE 0.89 06/20/2022    GLUCOSE 112 (H) 06/20/2022    CALCIUM 9.5 06/20/2022    AST 16 06/20/2022    ALT 17 06/20/2022    ALKPHOS 102 06/20/2022    LABIL2 1.4 (L) 05/26/2016     No results found for: CKTOTAL  Lab Results   Component Value Date    WBC 7.62 06/20/2022    HGB 14.4 06/20/2022    HCT 41.1 06/20/2022     06/20/2022     Lab Results   Component Value Date    INR <0.90 06/03/2015     Lab Results   Component Value Date    MG 2.2 06/20/2022     Lab Results   Component Value Date    TSH 0.273 06/20/2022    CHLPL 219 (H) 05/26/2016    TRIG 231 (H) 06/20/2022    HDL 38 (L) 06/20/2022     (H) 06/20/2022      No results found for: BNP          ECG 12 Lead    Date/Time: 4/27/2023 2:17 PM  Performed by: Patricia Simmons APRN  Authorized by: Patricia Simmons APRN   Comparison: compared with previous ECG   Similar " to previous ECG  Rhythm: sinus rhythm  BPM: 67  Other findings: non-specific ST-T wave changes  Comments:  ms              Assessment & Plan    Diagnosis Plan   1. Symptomatic PVCs  ECG 12 Lead    flecainide (TAMBOCOR) 50 MG tablet    metoprolol succinate XL (TOPROL-XL) 25 MG 24 hr tablet                  Recommendations:    Symptomatic PVCs- maintaining sinus rhythm on flecainide and metoprolol, will continue current doses.      Return in about 6 months (around 10/27/2023) for Recheck.    As always, I appreciate very much the opportunity to participate in the cardiovascular care of your patients.      With Best Regards,    LEBRON Caceres

## 2023-10-26 ENCOUNTER — TELEPHONE (OUTPATIENT)
Dept: CARDIOLOGY | Facility: CLINIC | Age: 63
End: 2023-10-26
Payer: COMMERCIAL

## 2023-10-26 ENCOUNTER — OFFICE VISIT (OUTPATIENT)
Dept: CARDIOLOGY | Facility: CLINIC | Age: 63
End: 2023-10-26
Payer: COMMERCIAL

## 2023-10-26 VITALS
HEART RATE: 64 BPM | HEIGHT: 66 IN | OXYGEN SATURATION: 96 % | DIASTOLIC BLOOD PRESSURE: 82 MMHG | SYSTOLIC BLOOD PRESSURE: 120 MMHG | WEIGHT: 180 LBS | BODY MASS INDEX: 28.93 KG/M2

## 2023-10-26 DIAGNOSIS — I49.3 SYMPTOMATIC PVCS: Chronic | ICD-10-CM

## 2023-10-26 RX ORDER — METOPROLOL SUCCINATE 25 MG/1
25 TABLET, EXTENDED RELEASE ORAL DAILY
Qty: 90 TABLET | Refills: 3 | Status: SHIPPED | OUTPATIENT
Start: 2023-10-26

## 2023-10-26 RX ORDER — FLECAINIDE ACETATE 50 MG/1
50 TABLET ORAL 2 TIMES DAILY
Qty: 180 TABLET | Refills: 3 | Status: SHIPPED | OUTPATIENT
Start: 2023-10-26

## 2023-10-26 RX ORDER — LEVOFLOXACIN 750 MG/1
750 TABLET ORAL DAILY
COMMUNITY

## 2023-10-26 RX ORDER — RIVAROXABAN 20 MG/1
20 TABLET, FILM COATED ORAL
Qty: 90 TABLET | Refills: 3 | Status: SHIPPED | OUTPATIENT
Start: 2023-10-26

## 2023-10-26 RX ORDER — PREDNISONE 20 MG/1
20 TABLET ORAL DAILY
COMMUNITY

## 2023-10-26 NOTE — TELEPHONE ENCOUNTER
Requested.       ----- Message from LEBRON Quinn sent at 10/26/2023  3:37 PM EDT -----  Regarding: Recent labs from Geronimo  Could you please get her most recent labs from Dr. Melton's office.  She thinks that they were done around May

## 2023-10-26 NOTE — PROGRESS NOTES
"Chief Complaint  Med Management (List. )    Subjective          Wendi Washington presents to [facility] for follow up.    History of Present Illness  Patient presents for routine follow-up of symptomatic PVCs and hypertension.  Her last visit to the clinic was 04/27/2023 with LEBRON Caceres.  At that time no changes were made to her medications.    Patient reports that she has been doing very well on her current medication regimen.  She has lost approximately 40 pounds intentionally.  That has reduced her need for NSAID therapy which was worrisome in combination with Xarelto.    Reports that she occasionally feels an irregular beat, but overall she is greatly improved.  Tobacco Use: Low Risk  (10/26/2023)    Patient History     Smoking Tobacco Use: Never     Smokeless Tobacco Use: Never     Passive Exposure: Not on file       Objective     Vital Signs:   /82 (BP Location: Right arm, Patient Position: Sitting, Cuff Size: Adult)   Pulse 64   Ht 167.6 cm (66\")   Wt 81.6 kg (180 lb)   SpO2 96%   BMI 29.05 kg/m²       Physical Exam  Vitals and nursing note reviewed.   Constitutional:       General: She is not in acute distress.  HENT:      Head: Normocephalic and atraumatic.   Neck:      Vascular: No carotid bruit.   Cardiovascular:      Rate and Rhythm: Normal rate and regular rhythm.      Pulses:           Posterior tibial pulses are 2+ on the right side and 2+ on the left side.      Heart sounds: No murmur heard.     No friction rub. No gallop.   Pulmonary:      Effort: Pulmonary effort is normal.      Breath sounds: Normal breath sounds.   Musculoskeletal:      Right lower leg: No edema.      Left lower leg: No edema.   Skin:     General: Skin is warm and dry.   Neurological:      General: No focal deficit present.      Mental Status: She is alert.   Psychiatric:         Mood and Affect: Mood normal.         Behavior: Behavior normal.          Result Review :   The " following data was reviewed by: LEBRON Quinn on 10/26/2023:    Data reviewed : Cardiology studies as detailed below      Last Cardiac Cath  Results for orders placed in visit on 01/24/14    Cardiac catheterization    Narrative  [address]  [address]      CARDIAC CATHETERIZATION    PATIENT NAME:  AMI MACIAS1  1  [facility] NO:   [id number]  [facility] NO:        [id number]        YOB: 1960      DATE OF ADMISSION:  01/24/2014  DATE OF PROCEDURE:  01/24/2014    AGE:  53    REFERRING PHYSICIAN:    PRIMARY CARE PHYSICIAN:  Corey Melton M.D.*  [place], and [place], [place]    ANGIOGRAPHER:  Byron Flores M.D., F.A.C.C., F.S.C.A.I.*    PROCEDURES PERFORMED:  1.  Left heart catheterization;  2.  Left ventriculography;  3.  Bilateral selective coronary angiography;  4.  Closure of the right common femoral artery utilizing a [employer] [facility] [employer],  [employer] [place], VIP, Angio-Seal device.    INDICATIONS FOR THIS PROCEDURE:  The patient is a 53-year-old woman with a chest pain syndrome and an abnormal  exercise nuclear study characterized by a small, reversible apical perfusion  defect and asymptomatic, 1.5 millimeter, ST-segment depression. Monitoring has  revealed periods of relatively prolonged accelerated idioventricular rhythm.    The patient is thought to have probable ischemic heart disease based on these  findings and is thought to be a candidate for cardiac catheterization studies.      DESCRIPTION OF THE PROCEDURE:  This study is carried out via the right common femoral artery using a [employer] [place]  catheter system. The Juliet technique is employed for coronary angiography.    Following the successful completion of this procedure and the review of an  acceptable vascular access site angiogram, the right common femoral artery is  closed using a [employer], [employer] [place], VIP, Angio-Seal device.    COMPLICATIONS:   There are no complications to this procedure.    Details are noted elsewhere.    RESULTS:    HEMODYNAMIC DATA (Pressures in mmHg):    LEFT HEART PRESSURES:  AORTA:  120/60 mmHg  LEFT VENTRICLE:  120/15 mmHg    ANGIOGRAPHIC FINDINGS:    LEFT VENTRICULOGRAM:  The left ventricle is filmed in a single plane, 30-degree, [place] projection.  There is very mild, distal apical hypokinesia. There is no mitral regurgitation  or mitral valve prolapse. The estimated ejection fraction is a 70 percent. This  is essentially  a normal left ventriculogram.    SELECTIVE CORONARY ANGIOGRAPHY:    NATIVE VESSELS:    LEFT MAIN CORONARY ARTERY:  Normal.    LEFT ANTERIOR DESCENDING CORONARY ARTERY:  This vessel gives rise to two diagonal branches proximally. The LAD and its  branches are angiographically normal.    LEFT CIRCUMFLEX CORONARY ARTERY:  This is a nondominant vessel that exits the left AV groove as two marginal  arteries over the posterior left ventricular wall. This vessel is  angiographically normal.    RIGHT CORONARY ARTERY:  This is an angiographically normal, right dominant vessel that gives rise to  posterior descending and posterolateral branches.    FINAL IMPRESSIONS:  1.  Normal left ventriculogram.  2.  Angiographically normal coronary arteries.        Byron Christian M.D., NUVIA, F.S.C.A.I.*  MRJ/rxjaw  DD:  01/24/2014 14:33:47  DT:  01/24/2014 23:36:40  Voice Rec. ID #[id number]  Original Voice Rec. ID #[id number]  Doc ID #[id number]  Revision Count:  0  cc:  Byron Christian M.D., NUVIA, F.S.C.A.I.*  Corey Melton M.D.*  [place] [address]      CARDIAC CATHETERIZATION    PATIENT NAME:  AMI MACIAS  1  [facility] NO:   [id number]  [facility] NO:        [id number]        YOB: 1960  <END HEADER>  DO NOT TEXT EDIT THIS LINE :[id number]:  Authenticated by BYRON CHRISTIAN M.D. On 01/27/2014 12:39:24 PM      Last Stress test  Results for  orders placed during the hospital encounter of 03/14/22    Stress Test With Myocardial Perfusion (1 Day)    Interpretation Summary  · A pharmacological stress test was performed using regadenoson without low-level exercise.  · Myocardial perfusion imaging indicates a normal myocardial perfusion study with no evidence of ischemia.  · Normal LV cavity size. Normal LV wall motion noted.  · Left ventricular ejection fraction is normal. (Calculated EF = 69%).  · Impressions are consistent with a low risk study.       Last Echo  Results for orders placed during the hospital encounter of 03/14/22    Adult Transthoracic Echo Complete w/ Color, Spectral and Contrast if necessary per protocol    Interpretation Summary  · Normal left ventricular cavity size and wall thickness noted. All left ventricular wall segments contract normally.  · Left ventricular ejection fraction appears to be 51 - 55%.  · The aortic valve is structurally normal with no regurgitation or stenosis present.  · The mitral valve is structurally normal with no regurgitation or significant stenosis present.  · There is no evidence of pericardial effusion. .         ECG 12 Lead    Date/Time: 10/26/2023 3:31 PM  Performed by: Jazmin King APRN    Authorized by: Jazmin King APRN  Comparison: compared with previous ECG from 4/27/2023  Similar to previous ECG  Comparison to previous ECG: Sinus rhythm 67 bpm QTc 431  Rhythm: sinus bradycardia  Rate: bradycardic  BPM: 59  QRS axis: normal    Clinical impression: non-specific ECG  Comments: QTc 419           Current Outpatient Medications   Medication Sig Dispense Refill    albuterol sulfate  (90 Base) MCG/ACT inhaler ProAir  (90 Base) MCG/ACT Inhalation Aerosol Solution      azelastine (ASTELIN) 0.1 % nasal spray 2 sprays into the nostril(s) as directed by provider 2 (Two) Times a Day. Use in each nostril as directed      Cholecalciferol (VITAMIN D PO) Take 1,000 Units by mouth Daily.       colchicine 0.6 MG tablet Take 1 tablet by mouth Daily.      Cyanocobalamin (VITAMIN B-12 PO) Take  by mouth Daily.      flecainide (TAMBOCOR) 50 MG tablet Take 1 tablet by mouth 2 (Two) Times a Day. 180 tablet 3    hydroxychloroquine (PLAQUENIL) 200 MG tablet Take  by mouth 2 (Two) Times a Day.      ibuprofen (ADVIL,MOTRIN) 800 MG tablet Take  by mouth Every 12 (Twelve) Hours.      levoFLOXacin (LEVAQUIN) 750 MG tablet Take 1 tablet by mouth Daily.      levothyroxine (SYNTHROID, LEVOTHROID) 137 MCG tablet Take 1 tablet by mouth Daily.  5    metoprolol succinate XL (TOPROL-XL) 25 MG 24 hr tablet Take 1 tablet by mouth Daily. 90 tablet 3    montelukast (SINGULAIR) 10 MG tablet Take 1 tablet by mouth Every Night.      ondansetron ODT (ZOFRAN-ODT) 4 MG disintegrating tablet Place 1 tablet on the tongue Every 6 (Six) Hours As Needed for Nausea or Vomiting. 12 tablet 0    oxybutynin XL (DITROPAN-XL) 10 MG 24 hr tablet Oxybutynin Chloride ER 10 MG Oral Tablet Extended Release 24 Hour      polyethylene glycol (MIRALAX) 17 GM/SCOOP powder Daily.      predniSONE (DELTASONE) 20 MG tablet Take 1 tablet by mouth Daily.      Saccharomyces boulardii (Probiotic) 250 MG capsule Take  by mouth Daily.      Tirzepatide 15 MG/0.5ML solution pen-injector       Xarelto 20 MG tablet Take 1 tablet by mouth Daily With Dinner. 90 tablet 3     No current facility-administered medications for this visit.            Assessment and Plan    Problem List Items Addressed This Visit       Symptomatic PVCs (Chronic)    Relevant Medications    flecainide (TAMBOCOR) 50 MG tablet    metoprolol succinate XL (TOPROL-XL) 25 MG 24 hr tablet    Xarelto 20 MG tablet     Diagnoses and all orders for this visit:    1. Symptomatic PVCs  -     flecainide (TAMBOCOR) 50 MG tablet; Take 1 tablet by mouth 2 (Two) Times a Day.  Dispense: 180 tablet; Refill: 3  -     metoprolol succinate XL (TOPROL-XL) 25 MG 24 hr tablet; Take 1 tablet by mouth Daily.  Dispense: 90  tablet; Refill: 3  -     Xarelto 20 MG tablet; Take 1 tablet by mouth Daily With Dinner.  Dispense: 90 tablet; Refill: 3    Other orders  -     ECG 12 Lead    Message sent to medical assistants to request recent labs from primary care provider.    Refill sent on medications.  No changes were made.      Follow Up     Return in about 6 months (around 4/26/2024) for Next scheduled follow up.    Patient was given instructions and counseling regarding her condition or for health maintenance advice. Please see specific information pulled into the AVS if appropriate.       Electronically signed by LEBRON Quinn, 10/26/23, 3:43 PM EDT.      Dictated Utilizing Dragon Dictation: Part of this note may be an electronic transcription/translation of spoken language to printed text using the Dragon Dictation System

## 2024-04-24 ENCOUNTER — OFFICE VISIT (OUTPATIENT)
Dept: CARDIOLOGY | Facility: CLINIC | Age: 64
End: 2024-04-24
Payer: COMMERCIAL

## 2024-04-24 VITALS
HEART RATE: 64 BPM | HEIGHT: 66 IN | OXYGEN SATURATION: 95 % | BODY MASS INDEX: 26.81 KG/M2 | SYSTOLIC BLOOD PRESSURE: 107 MMHG | DIASTOLIC BLOOD PRESSURE: 65 MMHG | WEIGHT: 166.8 LBS

## 2024-04-24 DIAGNOSIS — I49.3 SYMPTOMATIC PVCS: Primary | Chronic | ICD-10-CM

## 2024-04-24 PROCEDURE — 99213 OFFICE O/P EST LOW 20 MIN: CPT | Performed by: NURSE PRACTITIONER

## 2024-04-24 PROCEDURE — 93000 ELECTROCARDIOGRAM COMPLETE: CPT | Performed by: NURSE PRACTITIONER

## 2024-04-24 RX ORDER — RIVAROXABAN 20 MG/1
20 TABLET, FILM COATED ORAL
Qty: 90 TABLET | Refills: 3 | Status: SHIPPED | OUTPATIENT
Start: 2024-04-24

## 2024-04-24 RX ORDER — FLECAINIDE ACETATE 50 MG/1
50 TABLET ORAL 2 TIMES DAILY
Qty: 180 TABLET | Refills: 3 | Status: SHIPPED | OUTPATIENT
Start: 2024-04-24

## 2024-04-24 NOTE — PROGRESS NOTES
Corey Melton MD  Wendi Washington  1960 04/24/2024    Patient Active Problem List   Diagnosis    Symptomatic PVCs       Dear Corey Melton MD:    Subjective     Chief Complaint   Patient presents with    Follow-up     6 month follow up    Med Management     Med list           History of Present Illness:    Wendi Washington is a 63 y.o. female with a past medical history of pulmonary embolism and recurrent DVT on Xarelto, symptomatic PVC's on flecainide and metoprolol. She presents today for cardiology follow up. The patient denies chest pain, shortness of breath, palpitations, dizziness, lightheadedness, near syncope, and syncope. BP has been well controlled. She has lost nearly 70 lbs on GLP1 and feels great.           Allergies   Allergen Reactions    Bee Venom Swelling    Morphine Hives   :      Current Outpatient Medications:     albuterol sulfate  (90 Base) MCG/ACT inhaler, ProAir  (90 Base) MCG/ACT Inhalation Aerosol Solution, Disp: , Rfl:     azelastine (ASTELIN) 0.1 % nasal spray, 2 sprays into the nostril(s) as directed by provider 2 (Two) Times a Day. Use in each nostril as directed, Disp: , Rfl:     Cholecalciferol (VITAMIN D PO), Take 1,000 Units by mouth Daily., Disp: , Rfl:     colchicine 0.6 MG tablet, Take 1 tablet by mouth Daily., Disp: , Rfl:     Cyanocobalamin (VITAMIN B-12 PO), Take  by mouth Daily., Disp: , Rfl:     flecainide (TAMBOCOR) 50 MG tablet, Take 1 tablet by mouth 2 (Two) Times a Day., Disp: 180 tablet, Rfl: 3    hydroxychloroquine (PLAQUENIL) 200 MG tablet, Take  by mouth 2 (Two) Times a Day., Disp: , Rfl:     ibuprofen (ADVIL,MOTRIN) 800 MG tablet, Take  by mouth Every 12 (Twelve) Hours., Disp: , Rfl:     levothyroxine (SYNTHROID, LEVOTHROID) 137 MCG tablet, Take 1 tablet by mouth Daily., Disp: , Rfl: 5    metoprolol succinate XL (TOPROL-XL) 25 MG 24 hr tablet, Take 1 tablet by mouth Daily., Disp: 90 tablet, Rfl: 3    montelukast (SINGULAIR) 10 MG tablet, Take 1  "tablet by mouth Every Night., Disp: , Rfl:     ondansetron ODT (ZOFRAN-ODT) 4 MG disintegrating tablet, Place 1 tablet on the tongue Every 6 (Six) Hours As Needed for Nausea or Vomiting., Disp: 12 tablet, Rfl: 0    polyethylene glycol (MIRALAX) 17 GM/SCOOP powder, Daily., Disp: , Rfl:     Saccharomyces boulardii (Probiotic) 250 MG capsule, Take  by mouth Daily., Disp: , Rfl:     Tirzepatide 15 MG/0.5ML solution pen-injector, , Disp: , Rfl:     Xarelto 20 MG tablet, Take 1 tablet by mouth Daily With Dinner., Disp: 90 tablet, Rfl: 3    predniSONE (DELTASONE) 20 MG tablet, Take 1 tablet by mouth Daily. (Patient not taking: Reported on 4/24/2024), Disp: , Rfl:       The following portions of the patient's history were reviewed and updated as appropriate: allergies, current medications, past family history, past medical history, past social history, past surgical history and problem list.    Social History     Tobacco Use    Smoking status: Never    Smokeless tobacco: Never   Substance Use Topics    Alcohol use: No    Drug use: No       Review of Systems   Constitutional: Negative for decreased appetite and malaise/fatigue.   Cardiovascular:  Negative for chest pain, dyspnea on exertion and palpitations.   Respiratory:  Negative for cough and shortness of breath.        Objective   Vitals:    04/24/24 1442   BP: 107/65   BP Location: Left arm   Patient Position: Sitting   Cuff Size: Adult   Pulse: 64   SpO2: 95%   Weight: 75.7 kg (166 lb 12.8 oz)   Height: 167.6 cm (65.98\")     Body mass index is 26.94 kg/m².        Vitals reviewed.   Constitutional:       Appearance: Healthy appearance. Well-developed and not in distress.   HENT:      Head: Normocephalic and atraumatic.   Neck:      Vascular: No carotid bruit or JVD.   Pulmonary:      Effort: Pulmonary effort is normal.      Breath sounds: Normal breath sounds. No wheezing. No rales.   Cardiovascular:      Normal rate. Regular rhythm.      Murmurs: There is no murmur. "      . No S3 and S4 gallop.   Edema:     Peripheral edema absent.   Abdominal:      General: Bowel sounds are normal.      Palpations: Abdomen is soft.   Skin:     General: Skin is warm and dry.   Neurological:      Mental Status: Alert, oriented to person, place, and time and oriented to person, place and time.   Psychiatric:         Mood and Affect: Mood normal.         Behavior: Behavior normal.         Lab Results   Component Value Date     06/20/2022    K 4.6 06/20/2022     06/20/2022    CO2 24.7 06/20/2022    BUN 9 06/20/2022    CREATININE 0.89 06/20/2022    GLUCOSE 112 (H) 06/20/2022    CALCIUM 9.5 06/20/2022    AST 16 06/20/2022    ALT 17 06/20/2022    ALKPHOS 102 06/20/2022    LABIL2 1.4 (L) 05/26/2016     Lab Results   Component Value Date    WBC 7.62 06/20/2022    HGB 14.4 06/20/2022    HCT 41.1 06/20/2022     06/20/2022     Lab Results   Component Value Date    TSH 0.273 06/20/2022    CHLPL 219 (H) 05/26/2016    TRIG 231 (H) 06/20/2022    HDL 38 (L) 06/20/2022     (H) 06/20/2022          Results for orders placed during the hospital encounter of 03/14/22    Adult Transthoracic Echo Complete w/ Color, Spectral and Contrast if necessary per protocol    Interpretation Summary  · Normal left ventricular cavity size and wall thickness noted. All left ventricular wall segments contract normally.  · Left ventricular ejection fraction appears to be 51 - 55%.  · The aortic valve is structurally normal with no regurgitation or stenosis present.  · The mitral valve is structurally normal with no regurgitation or significant stenosis present.  · There is no evidence of pericardial effusion. .     Results for orders placed during the hospital encounter of 03/14/22    Stress Test With Myocardial Perfusion (1 Day)    Interpretation Summary  · A pharmacological stress test was performed using regadenoson without low-level exercise.  · Myocardial perfusion imaging indicates a normal myocardial  perfusion study with no evidence of ischemia.  · Normal LV cavity size. Normal LV wall motion noted.  · Left ventricular ejection fraction is normal. (Calculated EF = 69%).  · Impressions are consistent with a low risk study.       Results for orders placed in visit on 01/24/14    Cardiac catheterization    [place] [id number]      CARDIAC CATHETERIZATION    PATIENT NAME:  AMI MACIAS  1  [facility] NO:   [id number]  [facility] NO:        [id number]        YOB: 1960      DATE OF ADMISSION:  01/24/2014  DATE OF PROCEDURE:  01/24/2014    AGE:  53    REFERRING PHYSICIAN:    PRIMARY CARE PHYSICIAN:  Corey Melton M.D.*  Taylor Dickinson, and [address]eronimo  [place], [place]    ANGIOGRAPHER:  Byron Flores M.D., F.A.C.C., F.S.C.A.I.*    PROCEDURES PERFORMED:  1.  Left heart catheterization;  2.  Left ventriculography;  3.  Bilateral selective coronary angiography;  4.  Closure of the right common femoral artery utilizing a [facility],  6 [place], VIP, Angio-Seal device.    INDICATIONS FOR THIS PROCEDURE:  The patient is a 53-year-old woman with a chest pain syndrome and an abnormal  exercise nuclear study characterized by a small, reversible apical perfusion  defect and asymptomatic, 1.5 millimeter, ST-segment depression. Monitoring has  revealed periods of relatively prolonged accelerated idioventricular rhythm.    The patient is thought to have probable ischemic heart disease based on these  findings and is thought to be a candidate for cardiac catheterization studies.      DESCRIPTION OF THE PROCEDURE:  This study is carried out via the right common femoral artery using a 6 Tajik  catheter system. The Juliet technique is employed for coronary angiography.    Following the successful completion of this procedure and the review of an  acceptable vascular access site angiogram, the right common femoral artery  is  closed using a [facility], 6 [place], VIP, Angio-Seal device.    COMPLICATIONS:  There are no complications to this procedure.    Details are noted elsewhere.    RESULTS:    HEMODYNAMIC DATA (Pressures in mmHg):    LEFT HEART PRESSURES:  AORTA:  120/60 mmHg  LEFT VENTRICLE:  120/15 mmHg    ANGIOGRAPHIC FINDINGS:    LEFT VENTRICULOGRAM:  The left ventricle is filmed in a single plane, 30-degree, HUDDLESTON projection.  There is very mild, distal apical hypokinesia. There is no mitral regurgitation  or mitral valve prolapse. The estimated ejection fraction is a 70 percent. This  is essentially  a normal left ventriculogram.    SELECTIVE CORONARY ANGIOGRAPHY:    NATIVE VESSELS:    LEFT MAIN CORONARY ARTERY:  Normal.    LEFT ANTERIOR DESCENDING CORONARY ARTERY:  This vessel gives rise to two diagonal branches proximally. The LAD and its  branches are angiographically normal.    LEFT CIRCUMFLEX CORONARY ARTERY:  This is a nondominant vessel that exits the left AV groove as two marginal  arteries over the posterior left ventricular wall. This vessel is  angiographically normal.    RIGHT CORONARY ARTERY:  This is an angiographically normal, right dominant vessel that gives rise to  posterior descending and posterolateral branches.    FINAL IMPRESSIONS:  1.  Normal left ventriculogram.  2.  Angiographically normal coronary arteries.        Byron Flores M.D., NUVIA, F.S.C.A.I.*  MRJ/rxjaw  DD:  01/24/2014 14:33:47  DT:  01/24/2014 23:36:40  Voice Rec. ID #[id number]  Original Voice Rec. ID #[id number]  Doc ID #[id number]  Revision Count:  0  cc:  Byron Flores M.D., NUVIA, F.S.C.A.I.*  Corey Melton M.D.*  [place] [id number]      CARDIAC CATHETERIZATION    PATIENT NAME:  AMI MACIAS  1  [facility] NO:   [id number]  [facility] NO:        [id number]        YOB: 1960  <END HEADER>  DO NOT TEXT EDIT THIS LINE  :[id number]:  Authenticated by FORREST CHRISTIAN M.D. On 01/27/2014 12:39:24 PM          ECG 12 Lead    Date/Time: 4/24/2024 3:10 PM  Performed by: Patricia Simmons APRN    Authorized by: Patricia Simmons APRN  Comparison: compared with previous ECG   Similar to previous ECG  Rhythm: sinus rhythm  BPM: 62  Comments: QRS 90 ms  QTc 434 ms            Assessment & Plan    Diagnosis Plan   1. Symptomatic PVCs  ECG 12 Lead    Xarelto 20 MG tablet    flecainide (TAMBOCOR) 50 MG tablet                   Recommendations:    Symptomatic PVC's - resolved with flecainide and metoprolol. QRS and QTc acceptable on EKG.  Follow up in 6 months or sooner if needed.         Return in about 6 months (around 10/24/2024) for Recheck.    As always, I appreciate very much the opportunity to participate in the cardiovascular care of your patients.      With Best Regards,    LEBRON Caceres

## 2024-11-05 ENCOUNTER — OFFICE VISIT (OUTPATIENT)
Dept: CARDIOLOGY | Facility: CLINIC | Age: 64
End: 2024-11-05
Payer: COMMERCIAL

## 2024-11-05 VITALS
OXYGEN SATURATION: 96 % | WEIGHT: 150.8 LBS | HEART RATE: 72 BPM | HEIGHT: 66 IN | BODY MASS INDEX: 24.23 KG/M2 | SYSTOLIC BLOOD PRESSURE: 114 MMHG | DIASTOLIC BLOOD PRESSURE: 79 MMHG

## 2024-11-05 DIAGNOSIS — I49.3 SYMPTOMATIC PVCS: Primary | ICD-10-CM

## 2024-11-05 PROCEDURE — 99214 OFFICE O/P EST MOD 30 MIN: CPT | Performed by: NURSE PRACTITIONER

## 2024-11-05 PROCEDURE — 93000 ELECTROCARDIOGRAM COMPLETE: CPT | Performed by: NURSE PRACTITIONER

## 2024-11-05 NOTE — PROGRESS NOTES
Corey Melton MD  Wendi Washington  1960 11/05/2024    Patient Active Problem List   Diagnosis    Symptomatic PVCs       Dear Corey Melton MD:    Subjective     Chief Complaint   Patient presents with    Follow-up     6 month follow up       Med Management     Verbally discussed, she hasn't changed any medications, but she did decrease her dosage on one.            History of Present Illness:    Wendi Washington is a 64 y.o. female with a past medical history of  pulmonary embolism and recurrent DVT on Xarelto, symptomatic PVC's on flecainide and metoprolol. She presents today for cardiology follow up. She has lost 80 lbs. Her BP started to drop and she felt very fatigued. She decreased metoprolol to 25 mg daily and her BP has been much better. She states she feels great. The patient denies chest pain, shortness of breath, palpitations, dizziness, lightheadedness, near syncope, and syncope.            Allergies   Allergen Reactions    Bee Venom Swelling    Morphine Hives   :      Current Outpatient Medications:     albuterol sulfate  (90 Base) MCG/ACT inhaler, ProAir  (90 Base) MCG/ACT Inhalation Aerosol Solution, Disp: , Rfl:     azelastine (ASTELIN) 0.1 % nasal spray, Administer 2 sprays into the nostril(s) as directed by provider 2 (Two) Times a Day. Use in each nostril as directed, Disp: , Rfl:     Cholecalciferol (VITAMIN D PO), Take 1,000 Units by mouth Daily., Disp: , Rfl:     colchicine 0.6 MG tablet, Take 1 tablet by mouth Daily., Disp: , Rfl:     Cyanocobalamin (VITAMIN B-12 PO), Take  by mouth Daily., Disp: , Rfl:     flecainide (TAMBOCOR) 50 MG tablet, Take 1 tablet by mouth 2 (Two) Times a Day., Disp: 180 tablet, Rfl: 3    hydroxychloroquine (PLAQUENIL) 200 MG tablet, Take  by mouth 2 (Two) Times a Day., Disp: , Rfl:     ibuprofen (ADVIL,MOTRIN) 800 MG tablet, Take  by mouth Every 12 (Twelve) Hours., Disp: , Rfl:     levothyroxine (SYNTHROID, LEVOTHROID) 137 MCG tablet, Take 1  "tablet by mouth Daily., Disp: , Rfl: 5    metoprolol succinate XL (TOPROL-XL) 25 MG 24 hr tablet, Take 1 tablet by mouth Daily., Disp: 90 tablet, Rfl: 3    montelukast (SINGULAIR) 10 MG tablet, Take 1 tablet by mouth Every Night., Disp: , Rfl:     ondansetron ODT (ZOFRAN-ODT) 4 MG disintegrating tablet, Place 1 tablet on the tongue Every 6 (Six) Hours As Needed for Nausea or Vomiting., Disp: 12 tablet, Rfl: 0    polyethylene glycol (MIRALAX) 17 GM/SCOOP powder, Daily., Disp: , Rfl:     predniSONE (DELTASONE) 20 MG tablet, Take 1 tablet by mouth Daily., Disp: , Rfl:     Saccharomyces boulardii (Probiotic) 250 MG capsule, Take  by mouth Daily., Disp: , Rfl:     Tirzepatide 15 MG/0.5ML solution pen-injector, , Disp: , Rfl:     Xarelto 20 MG tablet, Take 1 tablet by mouth Daily With Dinner., Disp: 90 tablet, Rfl: 3      The following portions of the patient's history were reviewed and updated as appropriate: allergies, current medications, past family history, past medical history, past social history, past surgical history and problem list.    Social History     Tobacco Use    Smoking status: Never    Smokeless tobacco: Never   Substance Use Topics    Alcohol use: No    Drug use: No       Review of Systems   Constitutional: Negative for decreased appetite and malaise/fatigue.   Cardiovascular:  Negative for chest pain, dyspnea on exertion and palpitations.   Respiratory:  Negative for cough and shortness of breath.        Objective   Vitals:    11/05/24 1017   BP: 114/79   BP Location: Left arm   Patient Position: Sitting   Cuff Size: Adult   Pulse: 72   SpO2: 96%   Weight: 68.4 kg (150 lb 12.8 oz)   Height: 167.6 cm (65.98\")     Body mass index is 24.35 kg/m².        Vitals reviewed.   Constitutional:       Appearance: Healthy appearance. Well-developed and not in distress.   HENT:      Head: Normocephalic and atraumatic.   Neck:      Vascular: No carotid bruit or JVD.   Pulmonary:      Effort: Pulmonary effort is " normal.      Breath sounds: Normal breath sounds. No wheezing. No rales.   Cardiovascular:      Normal rate. Regular rhythm.      Murmurs: There is no murmur.      . No S3 and S4 gallop.   Edema:     Peripheral edema absent.   Abdominal:      General: Bowel sounds are normal.      Palpations: Abdomen is soft.   Skin:     General: Skin is warm and dry.   Neurological:      Mental Status: Alert, oriented to person, place, and time and oriented to person, place and time.   Psychiatric:         Mood and Affect: Mood normal.         Behavior: Behavior normal.         Lab Results   Component Value Date     06/20/2022    K 4.6 06/20/2022     06/20/2022    CO2 24.7 06/20/2022    BUN 9 06/20/2022    CREATININE 0.89 06/20/2022    GLUCOSE 112 (H) 06/20/2022    CALCIUM 9.5 06/20/2022    AST 16 06/20/2022    ALT 17 06/20/2022    ALKPHOS 102 06/20/2022    LABIL2 1.4 (L) 05/26/2016     Lab Results   Component Value Date    WBC 7.62 06/20/2022    HGB 14.4 06/20/2022    HCT 41.1 06/20/2022     06/20/2022     Lab Results   Component Value Date    TSH 0.273 06/20/2022    CHLPL 219 (H) 05/26/2016    TRIG 231 (H) 06/20/2022    HDL 38 (L) 06/20/2022     (H) 06/20/2022          Results for orders placed during the hospital encounter of 03/14/22    Adult Transthoracic Echo Complete w/ Color, Spectral and Contrast if necessary per protocol    Interpretation Summary  · Normal left ventricular cavity size and wall thickness noted. All left ventricular wall segments contract normally.  · Left ventricular ejection fraction appears to be 51 - 55%.  · The aortic valve is structurally normal with no regurgitation or stenosis present.  · The mitral valve is structurally normal with no regurgitation or significant stenosis present.  · There is no evidence of pericardial effusion. .     Results for orders placed during the hospital encounter of 03/14/22    Stress Test With Myocardial Perfusion (1 Day)    Interpretation  Summary  · A pharmacological stress test was performed using regadenoson without low-level exercise.  · Myocardial perfusion imaging indicates a normal myocardial perfusion study with no evidence of ischemia.  · Normal LV cavity size. Normal LV wall motion noted.  · Left ventricular ejection fraction is normal. (Calculated EF = 69%).  · Impressions are consistent with a low risk study.       Results for orders placed in visit on 01/24/14    Cardiac catheterization    [place] [id number]      CARDIAC CATHETERIZATION    PATIENT NAME:  AMI MACIAS  1  [facility] NO:   [id number]  [facility] NO:        [id number]        YOB: 1960      DATE OF ADMISSION:  01/24/2014  DATE OF PROCEDURE:  01/24/2014    AGE:  53    REFERRING PHYSICIAN:    PRIMARY CARE PHYSICIAN:  Corey Melton M.D.*  Taylor Dickinson, and [address]  [place], [place]    ANGIOGRAPHER:  Byron Flores M.D., F.A.C.C., F.S.C.A.I.*    PROCEDURES PERFORMED:  1.  Left heart catheterization;  2.  Left ventriculography;  3.  Bilateral selective coronary angiography;  4.  Closure of the right common femoral artery utilizing a [facility],  6 [place]ag[place], VIP, Angio-Seal device.    INDICATIONS FOR THIS PROCEDURE:  The patient is a 53-year-old woman with a chest pain syndrome and an abnormal  exercise nuclear study characterized by a small, reversible apical perfusion  defect and asymptomatic, 1.5 millimeter, ST-segment depression. Monitoring has  revealed periods of relatively prolonged accelerated idioventricular rhythm.    The patient is thought to have probable ischemic heart disease based on these  findings and is thought to be a candidate for cardiac catheterization studies.      DESCRIPTION OF THE PROCEDURE:  This study is carried out via the right common femoral artery using a 6 [place]ag[place]  catheter system. The Juliet technique is employed for coronary  angiography.    Following the successful completion of this procedure and the review of an  acceptable vascular access site angiogram, the right common femoral artery is  closed using a [facility], 6 [place], VIP, Angio-Seal device.    COMPLICATIONS:  There are no complications to this procedure.    Details are noted elsewhere.    RESULTS:    HEMODYNAMIC DATA (Pressures in mmHg):    LEFT HEART PRESSURES:  AORTA:  120/60 mmHg  LEFT VENTRICLE:  120/15 mmHg    ANGIOGRAPHIC FINDINGS:    LEFT VENTRICULOGRAM:  The left ventricle is filmed in a single plane, 30-degree, HUDDLESTON projection.  There is very mild, distal apical hypokinesia. There is no mitral regurgitation  or mitral valve prolapse. The estimated ejection fraction is a 70 percent. This  is essentially  a normal left ventriculogram.    SELECTIVE CORONARY ANGIOGRAPHY:    NATIVE VESSELS:    LEFT MAIN CORONARY ARTERY:  Normal.    LEFT ANTERIOR DESCENDING CORONARY ARTERY:  This vessel gives rise to two diagonal branches proximally. The LAD and its  branches are angiographically normal.    LEFT CIRCUMFLEX CORONARY ARTERY:  This is a nondominant vessel that exits the left AV groove as two marginal  arteries over the posterior left ventricular wall. This vessel is  angiographically normal.    RIGHT CORONARY ARTERY:  This is an angiographically normal, right dominant vessel that gives rise to  posterior descending and posterolateral branches.    FINAL IMPRESSIONS:  1.  Normal left ventriculogram.  2.  Angiographically normal coronary arteries.        Byron Flores M.D., NUVIA, F.S.C.A.I.*  MRJ/rxjaw  DD:  01/24/2014 14:33:47  DT:  01/24/2014 23:36:40  Voice Rec. ID #[id number]  Original Voice Rec. ID #[id number]  Doc ID #[id number]  Revision Count:  0  cc:  Byron Flores M.D., NUVIA, F.S.C.A.I.*  Corey Melton M.D.*  [place] [address]      CARDIAC CATHETERIZATION    PATIENT NAME:  AMI MACIAS            25[address]  1  [facility] NO:   [id number]  [facility] NO:        [id number]        YOB: 1960  <END HEADER>  DO NOT TEXT EDIT THIS LINE :[id number]:  Authenticated by FORREST CHRISTIAN M.D. On 01/27/2014 12:39:24 PM          ECG 12 Lead    Date/Time: 11/5/2024 10:44 AM  Performed by: Patricia Simmons APRN    Authorized by: Patricia Simmons APRN  Comparison: compared with previous ECG   Similar to previous ECG  Rhythm: sinus rhythm  BPM: 74  Other findings: non-specific ST-T wave changes  Comments: QRS 89 ms  QTc 405 ms          Assessment & Plan    Diagnosis Plan   1. Symptomatic PVCs  ECG 12 Lead               Recommendations:  Symptomatic PVC's - continue flecainide and metoprolol. QRS and QTc acceptable on EKG.  Follow up in 6 months or sooner if needed.       Return in about 6 months (around 5/5/2025) for Recheck.    As always, I appreciate very much the opportunity to participate in the cardiovascular care of your patients.      With Best Regards,    LEBRON Caceres

## 2024-12-06 DIAGNOSIS — I49.3 SYMPTOMATIC PVCS: Chronic | ICD-10-CM

## 2024-12-06 RX ORDER — METOPROLOL SUCCINATE 25 MG/1
25 TABLET, EXTENDED RELEASE ORAL DAILY
Qty: 30 TABLET | Refills: 3 | Status: SHIPPED | OUTPATIENT
Start: 2024-12-06

## 2025-02-19 ENCOUNTER — TRANSCRIBE ORDERS (OUTPATIENT)
Dept: ADMINISTRATIVE | Facility: HOSPITAL | Age: 65
End: 2025-02-19
Payer: COMMERCIAL

## 2025-02-19 DIAGNOSIS — C73 THYROID CANCER: Primary | ICD-10-CM

## 2025-03-03 ENCOUNTER — HOSPITAL ENCOUNTER (OUTPATIENT)
Facility: HOSPITAL | Age: 65
Discharge: HOME | End: 2025-03-03
Admitting: PHYSICIAN ASSISTANT
Payer: COMMERCIAL

## 2025-03-03 DIAGNOSIS — C73 THYROID CANCER: ICD-10-CM

## 2025-03-03 PROCEDURE — 76536 US EXAM OF HEAD AND NECK: CPT

## 2025-03-03 PROCEDURE — 76536 US EXAM OF HEAD AND NECK: CPT | Performed by: RADIOLOGY

## 2025-05-05 ENCOUNTER — OFFICE VISIT (OUTPATIENT)
Dept: CARDIOLOGY | Facility: CLINIC | Age: 65
End: 2025-05-05
Payer: COMMERCIAL

## 2025-05-05 VITALS
SYSTOLIC BLOOD PRESSURE: 103 MMHG | BODY MASS INDEX: 21.95 KG/M2 | WEIGHT: 136.6 LBS | DIASTOLIC BLOOD PRESSURE: 66 MMHG | HEART RATE: 68 BPM | HEIGHT: 66 IN | OXYGEN SATURATION: 95 %

## 2025-05-05 DIAGNOSIS — I49.3 SYMPTOMATIC PVCS: Primary | ICD-10-CM

## 2025-05-05 PROCEDURE — 99214 OFFICE O/P EST MOD 30 MIN: CPT | Performed by: NURSE PRACTITIONER

## 2025-05-05 PROCEDURE — 93000 ELECTROCARDIOGRAM COMPLETE: CPT | Performed by: NURSE PRACTITIONER

## 2025-05-05 RX ORDER — METOPROLOL SUCCINATE 25 MG/1
12.5 TABLET, EXTENDED RELEASE ORAL 2 TIMES DAILY
Start: 2025-05-05

## 2025-05-05 NOTE — PROGRESS NOTES
Corey Melton MD  Wendi Washington  1960 05/05/2025    Patient Active Problem List   Diagnosis    Symptomatic PVCs       Dear Corey Melton MD:    Subjective     Chief Complaint   Patient presents with    Follow-up     6 MONTH FOLLOW UP     Med Management     VERBAL, NO CHANGES.           History of Present Illness:    Wendi Washington is a 64 y.o. female with a past medical history of pulmonary embolism and recurrent DVT on Xarelto, symptomatic PVCs on flecainide and metoprolol.  She presents today for cardiology follow-up.  She reports she inadvertently was only taking flecainide once daily.  She has noted she has some palpitations in the evenings.  She denies any chest pains or shortness of breath.  Her blood pressure has been stable.          Allergies   Allergen Reactions    Bee Venom Swelling    Morphine Hives   :      Current Outpatient Medications:     albuterol sulfate  (90 Base) MCG/ACT inhaler, ProAir  (90 Base) MCG/ACT Inhalation Aerosol Solution, Disp: , Rfl:     azelastine (ASTELIN) 0.1 % nasal spray, Administer 2 sprays into the nostril(s) as directed by provider 2 (Two) Times a Day. Use in each nostril as directed, Disp: , Rfl:     Cholecalciferol (VITAMIN D PO), Take 1,000 Units by mouth Daily., Disp: , Rfl:     colchicine 0.6 MG tablet, Take 1 tablet by mouth Daily., Disp: , Rfl:     Cyanocobalamin (VITAMIN B-12 PO), Take  by mouth Daily., Disp: , Rfl:     flecainide (TAMBOCOR) 50 MG tablet, Take 1 tablet by mouth 2 (Two) Times a Day., Disp: 180 tablet, Rfl: 3    hydroxychloroquine (PLAQUENIL) 200 MG tablet, Take  by mouth 2 (Two) Times a Day., Disp: , Rfl:     ibuprofen (ADVIL,MOTRIN) 800 MG tablet, Take  by mouth Every 12 (Twelve) Hours., Disp: , Rfl:     levothyroxine (SYNTHROID, LEVOTHROID) 137 MCG tablet, Take 1 tablet by mouth Daily., Disp: , Rfl: 5    metoprolol succinate XL (TOPROL-XL) 25 MG 24 hr tablet, Take 0.5 tablets by mouth 2 (Two) Times a Day., Disp: , Rfl:      "montelukast (SINGULAIR) 10 MG tablet, Take 1 tablet by mouth Every Night., Disp: , Rfl:     ondansetron ODT (ZOFRAN-ODT) 4 MG disintegrating tablet, Place 1 tablet on the tongue Every 6 (Six) Hours As Needed for Nausea or Vomiting., Disp: 12 tablet, Rfl: 0    polyethylene glycol (MIRALAX) 17 GM/SCOOP powder, Daily., Disp: , Rfl:     predniSONE (DELTASONE) 20 MG tablet, Take 1 tablet by mouth Daily., Disp: , Rfl:     Saccharomyces boulardii (Probiotic) 250 MG capsule, Take  by mouth Daily., Disp: , Rfl:     Tirzepatide 15 MG/0.5ML solution pen-injector, , Disp: , Rfl:     Xarelto 20 MG tablet, Take 1 tablet by mouth Daily With Dinner., Disp: 90 tablet, Rfl: 3      The following portions of the patient's history were reviewed and updated as appropriate: allergies, current medications, past family history, past medical history, past social history, past surgical history and problem list.    Social History     Tobacco Use    Smoking status: Never    Smokeless tobacco: Never   Substance Use Topics    Alcohol use: No    Drug use: No       Review of Systems   Constitutional: Negative for decreased appetite and malaise/fatigue.   Cardiovascular:  Negative for chest pain, dyspnea on exertion and palpitations.   Respiratory:  Negative for cough and shortness of breath.        Objective   Vitals:    05/05/25 1452   BP: 103/66   BP Location: Left arm   Patient Position: Sitting   Cuff Size: Adult   Pulse: 68   SpO2: 95%   Weight: 62 kg (136 lb 9.6 oz)   Height: 167.6 cm (65.98\")     Body mass index is 22.06 kg/m².        Vitals reviewed.   Constitutional:       Appearance: Healthy appearance. Well-developed and not in distress.   HENT:      Head: Normocephalic and atraumatic.   Neck:      Vascular: No carotid bruit or JVD.   Pulmonary:      Effort: Pulmonary effort is normal.      Breath sounds: Normal breath sounds. No wheezing. No rales.   Cardiovascular:      Normal rate. Regular rhythm.      Murmurs: There is no murmur.     "  . No S3 and S4 gallop.   Edema:     Peripheral edema absent.   Abdominal:      General: Bowel sounds are normal.      Palpations: Abdomen is soft.   Skin:     General: Skin is warm and dry.   Neurological:      Mental Status: Alert, oriented to person, place, and time and oriented to person, place and time.   Psychiatric:         Mood and Affect: Mood normal.         Behavior: Behavior normal.         Lab Results   Component Value Date     06/20/2022    K 4.6 06/20/2022     06/20/2022    CO2 24.7 06/20/2022    BUN 9 06/20/2022    CREATININE 0.89 06/20/2022    GLUCOSE 112 (H) 06/20/2022    CALCIUM 9.5 06/20/2022    AST 16 06/20/2022    ALT 17 06/20/2022    ALKPHOS 102 06/20/2022     Lab Results   Component Value Date    WBC 7.62 06/20/2022    HGB 14.4 06/20/2022    HCT 41.1 06/20/2022     06/20/2022     Lab Results   Component Value Date    TSH 0.273 06/20/2022    CHLPL 219 (H) 05/26/2016    TRIG 231 (H) 06/20/2022    HDL 38 (L) 06/20/2022     (H) 06/20/2022          Results for orders placed during the hospital encounter of 03/14/22    Adult Transthoracic Echo Complete w/ Color, Spectral and Contrast if necessary per protocol    Interpretation Summary  · Normal left ventricular cavity size and wall thickness noted. All left ventricular wall segments contract normally.  · Left ventricular ejection fraction appears to be 51 - 55%.  · The aortic valve is structurally normal with no regurgitation or stenosis present.  · The mitral valve is structurally normal with no regurgitation or significant stenosis present.  · There is no evidence of pericardial effusion. .     Results for orders placed during the hospital encounter of 03/14/22    Stress Test With Myocardial Perfusion (1 Day)    Interpretation Summary  · A pharmacological stress test was performed using regadenoson without low-level exercise.  · Myocardial perfusion imaging indicates a normal myocardial perfusion study with no evidence  of ischemia.  · Normal LV cavity size. Normal LV wall motion noted.  · Left ventricular ejection fraction is normal. (Calculated EF = 69%).  · Impressions are consistent with a low risk study.       Results for orders placed in visit on 01/24/14    Cardiac catheterization    Narrative  [place] [id number]      CARDIAC CATHETERIZATION    PATIENT NAME:  AMI MACIAS  [facility] NO:   [id number]  [facility] NO:        [id number]        YOB: 1960      DATE OF ADMISSION:  01/24/2014  DATE OF PROCEDURE:  01/24/2014    AGE:  53    REFERRING PHYSICIAN:    PRIMARY CARE PHYSICIAN:  Corey Melton M.D.*  [facility] [place], and [place] [place], [place]    ANGIOGRAPHER:  Byron Flores M.D., F.A.C.C., F.S.C.A.I.*    PROCEDURES PERFORMED:  1.  Left heart catheterization;  2.  Left ventriculography;  3.  Bilateral selective coronary angiography;  4.  Closure of the right common femoral artery utilizing a [employer] [facility] [employer],  6 Indian, VIP, Angio-Seal device.    INDICATIONS FOR THIS PROCEDURE:  The patient is a 53-year-old woman with a chest pain syndrome and an abnormal  exercise nuclear study characterized by a small, reversible apical perfusion  defect and asymptomatic, 1.5 millimeter, ST-segment depression. Monitoring has  revealed periods of relatively prolonged accelerated idioventricular rhythm.    The patient is thought to have probable ischemic heart disease based on these  findings and is thought to be a candidate for cardiac catheterization studies.      DESCRIPTION OF THE PROCEDURE:  This study is carried out via the right common femoral artery using a 6 Indian  catheter system. The Juliet technique is employed for coronary angiography.    Following the successful completion of this procedure and the review of an  acceptable vascular access site angiogram, the right common femoral artery is  closed using a [employer] [facility] [employer], 6  Olvin, VIP, Angio-Seal device.    COMPLICATIONS:  There are no complications to this procedure.    Details are noted elsewhere.    RESULTS:    HEMODYNAMIC DATA (Pressures in mmHg):    LEFT HEART PRESSURES:  AORTA:  120/60 mmHg  LEFT VENTRICLE:  120/15 mmHg    ANGIOGRAPHIC FINDINGS:    LEFT VENTRICULOGRAM:  The left ventricle is filmed in a single plane, 30-degree, HUD[place]LE[place]ON projection.  There is very mild, distal apical hypokinesia. There is no mitral regurgitation  or mitral valve prolapse. The estimated ejection fraction is a 70 percent. This  is essentially  a normal left ventriculogram.    SELECTIVE CORONARY ANGIOGRAPHY:    NATIVE VESSELS:    LEFT MAIN CORONARY ARTERY:  Normal.    LEFT ANTERIOR DESCENDING CORONARY ARTERY:  This vessel gives rise to two diagonal branches proximally. The LAD and its  branches are angiographically normal.    LEFT CIRCUMFLEX CORONARY ARTERY:  This is a nondominant vessel that exits the left AV groove as two marginal  arteries over the posterior left ventricular wall. This vessel is  angiographically normal.    RIGHT CORONARY ARTERY:  This is an angiographically normal, right dominant vessel that gives rise to  posterior descending and posterolateral branches.    FINAL IMPRESSIONS:  1.  Normal left ventriculogram.  2.  Angiographically normal coronary arteries.        Byron Christian M.D., NUVIA, F.S.C.A.I.*  MRJ/rxjaw  DD:  01/24/2014 14:33:47  DT:  01/24/2014 23:36:40  Voice Rec. ID #[id number]  Original Voice Rec. ID #[id number]  Doc ID #[id number]  Revision Count:  0  cc:  Byron Christian M.D., NUVIA, F.S.C.A.I.*  Corey Melton M.D.*  [place] [id number]      CARDIAC CATHETERIZATION    PATIENT NAME:  AMI MACIAS  1  [facility] NO:   [id number]  [facility] NO:        [id number]        YOB: 1960  <END HEADER>  DO NOT TEXT EDIT THIS LINE :[id number]:  Authenticated by BYRON CHRISTIAN M.D. On 01/27/2014  12:39:24 PM          ECG 12 Lead    Date/Time: 5/5/2025 3:15 PM  Performed by: Patricia Simmons APRN    Authorized by: Patricia Simmons APRN  Comparison: compared with previous ECG   Similar to previous ECG  Rhythm: sinus rhythm  BPM: 70  Comments: QRS 84 ms  QTc 442 ms          Assessment & Plan    Diagnosis Plan   1. Symptomatic PVCs  ECG 12 Lead    metoprolol succinate XL (TOPROL-XL) 25 MG 24 hr tablet               Recommendations:  Symptomatic PVCs-likely due to missing the second dose of flecainide.  Advised her to take the flecainide 50 mg twice a day.  She is also going to take metoprolol succinate 12.5 mg twice daily to help with breakthrough palpitations.  QRS and QTc acceptable on EKG.  Follow-up in 6 months or sooner if needed.    Return in about 6 months (around 11/5/2025) for Recheck.    As always, I appreciate very much the opportunity to participate in the cardiovascular care of your patients.      With Best Regards,    LEBRON Caceres

## 2025-07-03 ENCOUNTER — TRANSCRIBE ORDERS (OUTPATIENT)
Dept: ADMINISTRATIVE | Facility: HOSPITAL | Age: 65
End: 2025-07-03
Payer: COMMERCIAL

## 2025-07-03 DIAGNOSIS — Z12.31 VISIT FOR SCREENING MAMMOGRAM: Primary | ICD-10-CM

## 2025-08-07 ENCOUNTER — TRANSCRIBE ORDERS (OUTPATIENT)
Dept: ADMINISTRATIVE | Facility: HOSPITAL | Age: 65
End: 2025-08-07
Payer: COMMERCIAL

## 2025-08-07 DIAGNOSIS — Z78.0 MENOPAUSE: Primary | ICD-10-CM

## 2025-08-08 ENCOUNTER — HOSPITAL ENCOUNTER (OUTPATIENT)
Dept: BONE DENSITY | Facility: HOSPITAL | Age: 65
Discharge: HOME | End: 2025-08-08
Payer: MEDICARE

## 2025-08-08 ENCOUNTER — HOSPITAL ENCOUNTER (OUTPATIENT)
Dept: MAMMOGRAPHY | Facility: HOSPITAL | Age: 65
Discharge: HOME | End: 2025-08-08
Payer: MEDICARE

## 2025-08-08 DIAGNOSIS — Z78.0 MENOPAUSE: ICD-10-CM

## 2025-08-08 DIAGNOSIS — Z12.31 VISIT FOR SCREENING MAMMOGRAM: ICD-10-CM

## 2025-08-08 PROCEDURE — 77063 BREAST TOMOSYNTHESIS BI: CPT

## 2025-08-08 PROCEDURE — 77067 SCR MAMMO BI INCL CAD: CPT

## 2025-08-08 PROCEDURE — 77080 DXA BONE DENSITY AXIAL: CPT
